# Patient Record
Sex: MALE | Race: WHITE | Employment: OTHER | URBAN - METROPOLITAN AREA
[De-identification: names, ages, dates, MRNs, and addresses within clinical notes are randomized per-mention and may not be internally consistent; named-entity substitution may affect disease eponyms.]

---

## 2017-09-06 ENCOUNTER — OFFICE VISIT (OUTPATIENT)
Dept: ORTHOPEDIC SURGERY | Age: 77
End: 2017-09-06

## 2017-09-06 VITALS — BODY MASS INDEX: 27.46 KG/M2 | HEIGHT: 74 IN | WEIGHT: 214 LBS

## 2017-09-06 DIAGNOSIS — M25.561 PAIN, JOINT, KNEE, RIGHT: Primary | ICD-10-CM

## 2017-09-06 DIAGNOSIS — Z96.651 H/O TOTAL KNEE REPLACEMENT, RIGHT: ICD-10-CM

## 2017-09-06 PROCEDURE — 3017F COLORECTAL CA SCREEN DOC REV: CPT | Performed by: ORTHOPAEDIC SURGERY

## 2017-09-06 PROCEDURE — 4040F PNEUMOC VAC/ADMIN/RCVD: CPT | Performed by: ORTHOPAEDIC SURGERY

## 2017-09-06 PROCEDURE — 1123F ACP DISCUSS/DSCN MKR DOCD: CPT | Performed by: ORTHOPAEDIC SURGERY

## 2017-09-06 PROCEDURE — 73560 X-RAY EXAM OF KNEE 1 OR 2: CPT | Performed by: ORTHOPAEDIC SURGERY

## 2017-09-06 PROCEDURE — 99213 OFFICE O/P EST LOW 20 MIN: CPT | Performed by: ORTHOPAEDIC SURGERY

## 2017-09-06 PROCEDURE — 1036F TOBACCO NON-USER: CPT | Performed by: ORTHOPAEDIC SURGERY

## 2017-09-06 PROCEDURE — G8427 DOCREV CUR MEDS BY ELIG CLIN: HCPCS | Performed by: ORTHOPAEDIC SURGERY

## 2017-09-06 PROCEDURE — G8419 CALC BMI OUT NRM PARAM NOF/U: HCPCS | Performed by: ORTHOPAEDIC SURGERY

## 2017-09-12 ENCOUNTER — HOSPITAL ENCOUNTER (OUTPATIENT)
Dept: PHYSICAL THERAPY | Age: 77
Discharge: HOME | End: 2017-09-30
Admitting: PHYSICAL MEDICINE & REHABILITATION

## 2017-09-12 NOTE — FLOWSHEET NOTE
[facility], [address] [place]  Phone: [phone]  Fax [phone]    Physical Therapy Daily Treatment Note  Date:  2017    Patient Name:  Willam Starks    :  1940  MRN: [id number]  Restrictions/Precautions:    Medical/Treatment Diagnosis Information:  · Diagnosis: Lumbar spondylosis M47.816, lumbar radiculopathy M54.16, low back pain M54.5  ·  treatment diagnosis: Low back pain J79.1  Insurance/Certification information:  PT Insurance Information: Medicare/ Medical Alpha.  Physician Information:  Referring Practitioner: Dr. Luis Keating of care signed (Y/N):     Date of Patient follow up with Physician: [facility] 17    G-Code (if applicable):      Date G-Code Applied:    PT G-Codes  Functional Assessment Tool Used: Oswestry  Score: 8%  Functional Limitation: Mobility: Walking and moving around  Mobility: Walking and Moving Around Current Status (): At least 1 percent but less than 20 percent impaired, limited or restricted  Mobility: Walking and Moving Around Goal Status (): At least 1 percent but less than 20 percent impaired, limited or restricted  Mobility: Walking and Moving Around Discharge Status (): At least 1 percent but less than 20 percent impaired, limited or restricted    Progress Note: [x]  Yes  []  No  Next due by: Visit #10      Latex Allergy:  [x]NO      []YES  Preferred Language for Healthcare:   [x]English       []other:    Visit # Insurance Allowable   1 Medicare/ Medical Alpha     Pain level:  -4/10     SUBJECTIVE:  See eval    OBJECTIVE: See eval  Observation:   Test measurements:      RESTRICTIONS/PRECAUTIONS:     Exercises/Interventions: HEP instruction.  See Media file    Therapeutic Ex sets/reps comments   DKTC stretches 5 HEP   HS stretches 5 HEP   LTR stretches 5 HEP   Piriformis stretches 5 HEP   Calf stretches 5 HEP        TrA H10 x 10 reps Manual Intervention                                              NMR re-education                                 Therapeutic Exercise and NMR EXR  [x] (90719) Provided verbal/tactile cueing for activities related to strengthening, flexibility, endurance, ROM  for improvements in proximal hip and core control with self care, mobility, lifting and ambulation.  [] (77505) Provided verbal/tactile cueing for activities related to improving balance, coordination, kinesthetic sense, posture, motor skill, proprioception  to assist with core control in self care, mobility, lifting, and ambulation.      Therapeutic Activities:    [x] (33131 or 28314) Provided verbal/tactile cueing for activities related to improving balance, coordination, kinesthetic sense, posture, motor skill, proprioception and motor activation to allow for proper function  with self care and ADLs  [] (40161) Provided training and instruction to the patient for proper core and proximal hip recruitment and positioning with ambulation re-education     Home Exercise Program:    [x] (78430) Reviewed/Progressed HEP activities related to strengthening, flexibility, endurance, ROM of core, proximal hip and LE for functional self-care, mobility, lifting and ambulation   [] (67711) Reviewed/Progressed HEP activities related to improving balance, coordination, kinesthetic sense, posture, motor skill, proprioception of core, proximal hip and LE for self care, mobility, lifting, and ambulation      Manual Treatments:  PROM / STM / Oscillations-Mobs:  G-I, II, III, IV (PA's, Inf., Post.)  [] (41875) Provided manual therapy to mobilize proximal hip and LS spine soft tissue/joints for the purpose of modulating pain, promoting relaxation,  increasing ROM, reducing/eliminating soft tissue swelling/inflammation/restriction, improving soft tissue extensibility and allowing for proper ROM for normal function with self care, mobility,

## 2017-09-12 NOTE — PLAN OF CARE
[facility], [address]  Phone: [phone]  Fax [phone]    Physical Therapy Certification    Dear Referring Practitioner: Dr. Nevin Yu,    We had the pleasure of evaluating the following patient for physical therapy services at [address] [place] [address]. A summary of our findings can be found in the initial assessment below. This includes our plan of care. If you have any questions or concerns regarding these findings, please do not hesitate to contact me at the office phone number checked above. Thank you for the referral.       Physician Signature:_______________________________Date:__________________  By signing above (or electronic signature), therapists plan is approved by physician    Patient: Bishop Alberto   : 1940   MRN: [id number]  Referring Physician: Referring Practitioner: Dr. Nevin Yu      Evaluation Date: 2017      Medical Diagnosis Information:  Diagnosis: Lumbar spondylosis M47.816, lumbar radiculopathy M54.16, low back pain M54.5          Treatment diagnosis: low back pain M54.5                                   Insurance information: PT Insurance Information: Medicare/ Medical [employer].     Precautions/ Contra-indications:   Latex Allergy:  [x]NO      []YES  Preferred Language for Healthcare:   [x]English       []other:    SUBJECTIVE: Patient stated complaint:Reports low back pain/buttock pain noted for last year. Has ANIBAL for lumbar spine scheduled for 17. Denies shooting pain down R LE but has sharp pain in ankle and knee on occasion. Describes feeling as more tightening versus pain. Main complaint is walking/stairs are affected.  R THR scheduled for next week by Dr. Prudencio Hutson History: History of R TKR , L hip resurfacing , Scheduled for R THR on 17  Functional Disability Index/G-Codes:  PT G-Codes  Functional assessed and Patient requires intervention due to being higher risk   TUG score (>12s at risk):     [] Falls education provided, including       G-Codes:  PT G-Codes  Functional Assessment Tool Used: Oswestry  Score: 8%  Functional Limitation: Mobility: Walking and moving around  Mobility: Walking and Moving Around Current Status (): At least 1 percent but less than 20 percent impaired, limited or restricted  Mobility: Walking and Moving Around Goal Status (): At least 1 percent but less than 20 percent impaired, limited or restricted  Mobility: Walking and Moving Around Discharge Status ():  At least 1 percent but less than 20 percent impaired, limited or restricted    ASSESSMENT:   Functional Impairments:     [x]Noted lumbar/proximal hip hypomobility   []Noted lumbosacral and/or generalized hypermobility   []Decreased Lumbosacral/hip/LE functional ROM   []Decreased core/proximal hip strength and neuromuscular control    [x]Decreased LE functional strength    []Abnormal reflexes/sensation/myotomal/dermatomal deficits  []Reduced balance/proprioceptive control    []other:      Functional Activity Limitations (from functional questionnaire and intake)   []Reduced ability to tolerate prolonged functional positions   []Reduced ability or difficulty with changes of positions or transfers between positions   []Reduced ability to maintain good posture and demonstrate good body mechanics with sitting, bending, and lifting   []Reduced ability to sleep   [] Reduced ability or tolerance with driving and/or computer work   []Reduced ability to perform lifting, reaching, carrying tasks   [x]Reduced ability to squat   []Reduced ability to forward bend   [x]Reduced ability to ambulate prolonged functional periods/distances/surfaces   [x]Reduced ability to ascend/descend stairs   []other:       Participation Restrictions   []Reduced participation in self care activities   []Reduced participation in home management activities   []Reduced participation in work activities   [x]Reduced participation in social activities. []Reduced participation in sport/recreation activities. Classification:   []Signs/symptoms consistent with Lumbar instability/stabilization subgroup. []Signs/symptoms consistent with Lumbar mobilization/manipulation subgroup, myotomes and dermatomes intact. Meets manipulation criteria. []Signs/symptoms consistent with Lumbar direction specific/centralization subgroup   []Signs/symptoms consistent with Lumbar traction subgroup     []Signs/symptoms consistent with lumbar facet dysfunction   [x]Signs/symptoms consistent with lumbar stenosis type dysfunction   []Signs/symptoms consistent with nerve root involvement including myotome & dermatome dysfunction   []Signs/symptoms consistent with post-surgical status including: decreased ROM, strength and function.    []signs/symptoms consistent with pathology which may benefit from Dry needling     []other:      Prognosis/Rehab Potential:      []Excellent   [x]Good    []Fair   []Poor    Tolerance of evaluation/treatment:    []Excellent   [x]Good    []Fair   []Poor  Physical Therapy Evaluation Complexity Justification  [x] A history of present problem with:  [] no personal factors and/or comorbidities that impact the plan of care;  [x]1-2 personal factors and/or comorbidities that impact the plan of care  []3 personal factors and/or comorbidities that impact the plan of care  [x] An examination of body systems using standardized tests and measures addressing any of the following: body structures and functions (impairments), activity limitations, and/or participation restrictions;:  [x] a total of 1-2 or more elements   [] a total of 3 or more elements   [] a total of 4 or more elements   [x] A clinical presentation with:  [x] stable and/or uncomplicated characteristics   [] evolving clinical presentation with changing characteristics  [] unstable and

## 2017-09-27 ENCOUNTER — HOSPITAL ENCOUNTER (OUTPATIENT)
Dept: PHYSICAL THERAPY | Age: 77
Discharge: HOME | End: 2017-09-30
Admitting: ORTHOPAEDIC SURGERY

## 2017-09-27 NOTE — FLOWSHEET NOTE
tissue extensibility and allowing for proper ROM for normal function with self care, mobility, lifting and ambulation. Modalities:  CP to R hip for 15 min     Charges:  Timed Code Treatment Minutes: 25   Total Treatment Minutes: 60     [x] EVAL (LOW) 70559 (typically 20 minutes face-to-face)  [] EVAL (MOD) 88696 (typically 30 minutes face-to-face)  [] EVAL (HIGH) 43912 (typically 45 minutes face-to-face)  [] RE-EVAL     [x] YN(01592) x  2   [] IONTO  [] NMR (11861) x      [] VASO  [] Manual (22703) x       [] Other:  [] TA x       [] Mech Traction (50126)  [] ES(attended) (78821)      [] ES (un) (39770):     GOALS:   Short Term Goals: To be achieved in: 2 weeks  1. Independent in HEP and progression per patient tolerance, in order to prevent re-injury. 2. Patient will have a decrease in pain to facilitate improvement in movement, function, and ADLs as indicated by Functional Deficits. Long Term Goals: To be achieved in: 8 weeks  1. Disability index score of 29% or less for the LEFS to assist with reaching prior level of function. 2. Patient will demonstrate increased AROM to R hip flex 100°, abd to 35° to allow for proper joint functioning as indicated by patients Functional Deficits. 3. Patient will demonstrate an increase in Strength to in LE to allow for proper functional mobility as indicated by patients Functional Deficits. 4. Patient will return to ambulating with proper gait without an AD, ascend and descend stairs with reciprocal without increased symptoms or restriction. Progression Towards Functional goals:  [] Patient is progressing as expected towards functional goals listed. [] Progression is slowed due to complexities listed. [] Progression has been slowed due to co-morbidities.   [x] Plan just implemented, too soon to assess goals progression  [] Other:     ASSESSMENT:  See eval    Treatment/Activity Tolerance:  [x] Patient tolerated treatment well [] Patient limited by

## 2017-10-05 ENCOUNTER — HOSPITAL ENCOUNTER (OUTPATIENT)
Dept: PHYSICAL THERAPY | Age: 77
Discharge: HOME | End: 2017-10-05
Admitting: ORTHOPAEDIC SURGERY

## 2017-10-05 NOTE — FLOWSHEET NOTE
stretch, gastroc stretch  12 min                                   NMR re-education                                              Therapeutic Exercise and NMR EXR  [x] (19582) Provided verbal/tactile cueing for activities related to strengthening, flexibility, endurance, ROM for improvements in LE, proximal hip, and core control with self care, mobility, lifting, ambulation. [x] (00143) Provided verbal/tactile cueing for activities related to improving balance, coordination, kinesthetic sense, posture, motor skill, proprioception  to assist with LE, proximal hip, and core control in self care, mobility, lifting, ambulation and eccentric single leg control.      NMR and Therapeutic Activities:    [] (00182 or 18971) Provided verbal/tactile cueing for activities related to improving balance, coordination, kinesthetic sense, posture, motor skill, proprioception and motor activation to allow for proper function of core, proximal hip and LE with self care and ADLs  [] (21599) Gait Re-education- Provided training and instruction to the patient for proper LE, core and proximal hip recruitment and positioning and eccentric body weight control with ambulation re-education including up and down stairs     Home Exercise Program:    [x] (66587) Reviewed/Progressed HEP activities related to strengthening, flexibility, endurance, ROM of core, proximal hip and LE for functional self-care, mobility, lifting and ambulation/stair navigation   [] (03764)Reviewed/Progressed HEP activities related to improving balance, coordination, kinesthetic sense, posture, motor skill, proprioception of core, proximal hip and LE for self care, mobility, lifting, and ambulation/stair navigation      Manual Treatments:  PROM / STM / Oscillations-Mobs:  G-I, II, III, IV (PA's, Inf., Post.)  [x] (25315) Provided manual therapy to mobilize LE, proximal hip and/or LS spine soft tissue/joints for the purpose of modulating pain, promoting relaxation, increasing ROM, reducing/eliminating soft tissue swelling/inflammation/restriction, improving soft tissue extensibility and allowing for proper ROM for normal function with self care, mobility, lifting and ambulation. Modalities:   pt declined will ice at home     Charges:  Timed Code Treatment Minutes: 40   Total Treatment Minutes: 40     [] EVAL (LOW) 51555 (typically 20 minutes face-to-face)  [] EVAL (MOD) 67869 (typically 30 minutes face-to-face)  [] EVAL (HIGH) 28850 (typically 45 minutes face-to-face)  [] RE-EVAL     [x] SP(73286) x  2   [] IONTO  [] NMR (33821) x      [] VASO  [x] Manual (45465) x  1    [] Other:  [] TA x       [] Mech Traction (40936)  [] ES(attended) (59762)      [] ES (un) (76905):     GOALS:   Short Term Goals: To be achieved in: 2 weeks  1. Independent in HEP and progression per patient tolerance, in order to prevent re-injury. 2. Patient will have a decrease in pain to facilitate improvement in movement, function, and ADLs as indicated by Functional Deficits. Long Term Goals: To be achieved in: 8 weeks  1. Disability index score of 29% or less for the LEFS to assist with reaching prior level of function. 2. Patient will demonstrate increased AROM to R hip flex 100°, abd to 35° to allow for proper joint functioning as indicated by patients Functional Deficits. 3. Patient will demonstrate an increase in Strength to in LE to allow for proper functional mobility as indicated by patients Functional Deficits. 4. Patient will return to ambulating with proper gait without an AD, ascend and descend stairs with reciprocal without increased symptoms or restriction. Progression Towards Functional goals:  [] Patient is progressing as expected towards functional goals listed. [] Progression is slowed due to complexities listed. [] Progression has been slowed due to co-morbidities.   [x] Plan just implemented, too soon to assess goals progression  [] Other:     ASSESSMENT:

## 2017-10-09 ENCOUNTER — HOSPITAL ENCOUNTER (OUTPATIENT)
Dept: PHYSICAL THERAPY | Age: 77
Discharge: HOME | End: 2017-10-09
Admitting: ORTHOPAEDIC SURGERY

## 2017-10-09 NOTE — FLOWSHEET NOTE
tissue/joints for the purpose of modulating pain, promoting relaxation,  increasing ROM, reducing/eliminating soft tissue swelling/inflammation/restriction, improving soft tissue extensibility and allowing for proper ROM for normal function with self care, mobility, lifting and ambulation. Modalities:   pt declined will ice at home     Charges:  Timed Code Treatment Minutes: 45   Total Treatment Minutes: 45     [] EVAL (LOW) 07395 (typically 20 minutes face-to-face)  [] EVAL (MOD) 24494 (typically 30 minutes face-to-face)  [] EVAL (HIGH) 41555 (typically 45 minutes face-to-face)  [] RE-EVAL     [x] TT(35380) x  2   [] IONTO  [] NMR (87591) x      [] VASO  [x] Manual (34012) x  1    [] Other:  [] TA x       [] Mech Traction (59013)  [] ES(attended) (21320)      [] ES (un) (95168):     GOALS:   Short Term Goals: To be achieved in: 2 weeks  1. Independent in HEP and progression per patient tolerance, in order to prevent re-injury. 2. Patient will have a decrease in pain to facilitate improvement in movement, function, and ADLs as indicated by Functional Deficits. Long Term Goals: To be achieved in: 8 weeks  1. Disability index score of 29% or less for the LEFS to assist with reaching prior level of function. 2. Patient will demonstrate increased AROM to R hip flex 100°, abd to 35° to allow for proper joint functioning as indicated by patients Functional Deficits. 3. Patient will demonstrate an increase in Strength to in LE to allow for proper functional mobility as indicated by patients Functional Deficits. 4. Patient will return to ambulating with proper gait without an AD, ascend and descend stairs with reciprocal without increased symptoms or restriction. Progression Towards Functional goals:  [] Patient is progressing as expected towards functional goals listed. [] Progression is slowed due to complexities listed. [] Progression has been slowed due to co-morbidities.   [x] Plan just

## 2017-10-11 ENCOUNTER — HOSPITAL ENCOUNTER (OUTPATIENT)
Dept: PHYSICAL THERAPY | Age: 77
Discharge: HOME | End: 2017-10-11
Admitting: ORTHOPAEDIC SURGERY

## 2017-10-11 NOTE — FLOWSHEET NOTE
[facility], [address] [place] [address] [place]  Phone: [phone]  Fax [phone]    Physical Therapy Daily Treatment Note  Date:  10/11/2017    Patient Name:  Lili Eisenberg    :  1940  MRN: [id number]  Restrictions/Precautions:    Medical/Treatment Diagnosis Information:  Diagnosis: R ant THR 17   Treatment Diagnosis: R hip pain R52.168  Insurance/Certification information:  PT Insurance Information: medicare   Physician Information:  Referring Practitioner: Dr. Astrid Aranda of care signed (Y/N): faxed; POC expires 17     Date of Patient follow up with Physician:      G-Code (if applicable):      Date G-Code Applied:  LEFS 51% 17        Progress Note: []  Yes  [x]  No  Next due by: Visit #10       Latex Allergy:  [x]NO      []YES  Preferred Language for Healthcare:   [x]English       []other:    Visit # Insurance Allowable   4 BMN      Pain level:  0/10 R hip     SUBJECTIVE: pt reports he feels like he has taken a step back interms of soreness to the front of his hip. He has started using his walking stick when walking outside the house because he does not feel like he can put as much weight through his R leg as he could before.   .     OBJECTIVE:   Observation:   Test measurements:      RESTRICTIONS/PRECAUTIONS: R TKR, hx of LBP,     Exercises/Interventions:     Therapeutic Ex Sets/sec Reps Notes   Supine TrA with hip abd with TB    Hep    Supine TrA with bridge with hip abd with VT  5\" 2x10   Hep    Supine SB knee flex   10\"x10   Hep    Sitting HS and gastroc stretch  3x30\" ea   Hep    Supine bridge with SB  5\" 2x10      Mini squat on airex  5\" 2x10      saq 4# 5\" 2x10      HR        Side stepping with TB      2 laps OTB       LAQ  5\" 2x10 3#      Bike  5 min warm up      Standing incline gastroc stretch  3x30\"      Manual Intervention      R hip PROM, HS stretch, gastroc stretch ; prone quad co-morbidities.   [] Plan just implemented, too soon to assess goals progression  [] Other:     ASSESSMENT:    Improved gait with walking stick, able to increase step length     Treatment/Activity Tolerance:  [x] Patient tolerated treatment well [] Patient limited by fatique  [] Patient limited by pain  [] Patient limited by other medical complications  [] Other:     Prognosis: [x] Good [] Fair  [] Poor    Patient Requires Follow-up: [x] Yes  [] No    PLAN:   [x] Continue per plan of care [] Alter current plan (see comments)  [] Plan of care initiated [] Hold pending MD visit [] Discharge    Electronically signed by: Griselda Quaker, [id number]

## 2017-10-16 ENCOUNTER — HOSPITAL ENCOUNTER (OUTPATIENT)
Dept: PHYSICAL THERAPY | Age: 77
Discharge: HOME | End: 2017-10-16
Admitting: ORTHOPAEDIC SURGERY

## 2017-10-16 NOTE — FLOWSHEET NOTE
Progression is slowed due to complexities listed. [] Progression has been slowed due to co-morbidities.   [] Plan just implemented, too soon to assess goals progression  [] Other:     ASSESSMENT:    Decreased balance with SLS on airex, requires UE asst     Treatment/Activity Tolerance:  [x] Patient tolerated treatment well [] Patient limited by fatique  [] Patient limited by pain  [] Patient limited by other medical complications  [] Other:     Prognosis: [x] Good [] Fair  [] Poor    Patient Requires Follow-up: [x] Yes  [] No    PLAN:   [x] Continue per plan of care [] Alter current plan (see comments)  [] Plan of care initiated [] Hold pending MD visit [] Discharge    Electronically signed by: Deacon Portillo, [id number]

## 2017-10-18 ENCOUNTER — HOSPITAL ENCOUNTER (OUTPATIENT)
Dept: PHYSICAL THERAPY | Age: 77
Discharge: HOME | End: 2017-10-18
Admitting: ORTHOPAEDIC SURGERY

## 2017-10-18 NOTE — FLOWSHEET NOTE
quad stretch, supine psoas stretch with thigh on mat  15 min                                   NMR re-education      Standing weight shifts on airex       Gait training without an AD        SLS  airex  5\" 2x10      FSU and LSU  2x10 ea 4\"      Standing TKE with TB  BTB 5\" 2x10                Therapeutic Exercise and NMR EXR  [x] (33728) Provided verbal/tactile cueing for activities related to strengthening, flexibility, endurance, ROM for improvements in LE, proximal hip, and core control with self care, mobility, lifting, ambulation. [x] (77080) Provided verbal/tactile cueing for activities related to improving balance, coordination, kinesthetic sense, posture, motor skill, proprioception  to assist with LE, proximal hip, and core control in self care, mobility, lifting, ambulation and eccentric single leg control.      NMR and Therapeutic Activities:    [] (33274 or 34368) Provided verbal/tactile cueing for activities related to improving balance, coordination, kinesthetic sense, posture, motor skill, proprioception and motor activation to allow for proper function of core, proximal hip and LE with self care and ADLs  [x] (12795) Gait Re-education- Provided training and instruction to the patient for proper LE, core and proximal hip recruitment and positioning and eccentric body weight control with ambulation re-education including up and down stairs     Home Exercise Program:    [x] (93706) Reviewed/Progressed HEP activities related to strengthening, flexibility, endurance, ROM of core, proximal hip and LE for functional self-care, mobility, lifting and ambulation/stair navigation   [] (53698)Reviewed/Progressed HEP activities related to improving balance, coordination, kinesthetic sense, posture, motor skill, proprioception of core, proximal hip and LE for self care, mobility, lifting, and ambulation/stair navigation      Manual Treatments:  PROM / STM / Oscillations-Mobs:  G-I, II, III, IV (PA's, Inf., complexities listed. [] Progression has been slowed due to co-morbidities.   [] Plan just implemented, too soon to assess goals progression  [] Other:     ASSESSMENT:     Pt has increasing R hip AROM     Treatment/Activity Tolerance:  [x] Patient tolerated treatment well [] Patient limited by fatique  [] Patient limited by pain  [] Patient limited by other medical complications  [] Other:     Prognosis: [x] Good [] Fair  [] Poor    Patient Requires Follow-up: [x] Yes  [] No    PLAN:  start ATC NV   [x] Continue per plan of care [] Alter current plan (see comments)  [] Plan of care initiated [] Hold pending MD visit [] Discharge    Electronically signed by: Griselda Quaker, [id number]

## 2017-10-23 ENCOUNTER — HOSPITAL ENCOUNTER (OUTPATIENT)
Dept: PHYSICAL THERAPY | Age: 77
Discharge: HOME | End: 2017-10-23
Admitting: ORTHOPAEDIC SURGERY

## 2017-10-23 NOTE — FLOWSHEET NOTE
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 Joy Doherty Post-op  8 wks  12 wks 16 wks 20 wks   24 wks                            Activity Log                                                  DOS/DOI:                                                    Date: 10/23/17    ATC communication    Bike    Elliptical    Treadmill    Airdyne        Gastroc stretch    Soleus stretch    Hamstring stretch    ITB stretch    Hip Flexor stretch    Quad stretch    Adductor stretch        Weight Shifting sp                              fp                              tp    Lateral walking (with/w/o TB)        Balance: PEP/Deysi board                   SLS Tandem R/L 5x10\" ea   SLS 5x10\"          Star excursion load/explode          Extremity reach UE/LE        Leg Press Gm. 80# 2x10                      Ecc.                      Inv. Calf Press Gm. Ecc.                        Inv.        PHILIP   Flex               ABd               ADd              TKE 45# 20x5\"              Ext        Steps Up 6\" FSU15x              Up and Over               Down               Lateral 6\" LSU 15x              Rotation        Squats  mini                  wall                 BOSU         Lunges:  Lunge to Balance                   Balance to Lunge                   Walking        Knee Extension Bilat. Ecc.                               Inv. Hamstring Curls Bilat.                                Ecc. Inv.        Soleus Press Bilat. Ecc.                           Inv.                             Ladders                Square               Jump/Hop  Low                      Med.                      High                                                            Modality Declined   Initials                             BMG

## 2017-10-25 ENCOUNTER — HOSPITAL ENCOUNTER (OUTPATIENT)
Dept: PHYSICAL THERAPY | Age: 77
Discharge: HOME | End: 2017-10-25
Admitting: ORTHOPAEDIC SURGERY

## 2017-10-25 NOTE — FLOWSHEET NOTE
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 Renard Leyva Post-op  8 wks  12 wks 16 wks 20 wks   24 wks                            Activity Log                                                  DOS/DOI:                                                    Date: 10/23/17     ATC communication     Bike     Elliptical     Treadmill     Airdyne          Gastroc stretch     Soleus stretch     Hamstring stretch     ITB stretch     Hip Flexor stretch     Quad stretch     Adductor stretch          Weight Shifting sp                               fp                               tp     Lateral walking (with/w/o TB)          Balance: PEP/Deysi board                    SLS Tandem R/L 5x10\" ea   SLS 5x10\"  w/PT         Star excursion load/explode           Extremity reach UE/LE          Leg Press Gm. 80# 2x10  80# 2x10                     Ecc.                       Inv. Calf Press Gm. Ecc.                         Inv.          PHILIP   Flex                ABd  30# R/L 2x10              ADd               TKE 45# 20x5\" 45# 20x5\"              Ext          Steps Up 6\" FSU15x steps w/PT              Up and Over                Down                Lateral 6\" LSU 15x               Rotation          Squats  mini  w/PT                 wall                  BOSU           Lunges:  Lunge to Balance                    Balance to Lunge                    Walking          Knee Extension Bilat.                                                 Ecc.

## 2017-10-30 ENCOUNTER — HOSPITAL ENCOUNTER (OUTPATIENT)
Dept: PHYSICAL THERAPY | Age: 77
Discharge: HOME | End: 2017-10-30
Admitting: ORTHOPAEDIC SURGERY

## 2017-10-30 NOTE — PLAN OF CARE
goals:  Summary/Patient's response to treatment/Additional assessment:    New or Updated Goals (if applicable):  [x] No change to goals established upon initial eval/last progress note:  New Goals:    Electronically signed by:  Gianna Del Angel PT

## 2017-10-30 NOTE — FLOWSHEET NOTE
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 Essie Taveras of care signed (Y/N): faxed; POC expires 17     Date of Patient follow up with Physician:      G-Code (if applicable):      Date G-Code Applied:  LEFS 48% 10/30/17   PT G-Codes  Functional Assessment Tool Used: LEFS   Score: 48%  Functional Limitation: Mobility: Walking and moving around  Mobility: Walking and Moving Around Current Status (): At least 40 percent but less than 60 percent impaired, limited or restricted  Mobility: Walking and Moving Around Goal Status ():  At least 20 percent but less than 40 percent impaired, limited or restricted    Progress Note: [x]  Yes  []  No  Next due by: Visit #10       Latex Allergy:  [x]NO      []YES  Preferred Language for Healthcare:   [x]English       []other:    Visit # Insurance Allowable   9 BMN      Pain level:  0/10 R hip     SUBJECTIVE:   See [facility] 10th visit note      OBJECTIVE: 5.5 weeks post op  Observation:   Test measurements:   Cautioned patient against performing twisting motions at the hip     RESTRICTIONS/PRECAUTIONS: R TKR, hx of LBP,     Exercises/Interventions:     Therapeutic Ex Sets/sec Reps Notes   Supine knee flex with belt, with lower leg off table  10\"x10   Hep    Supine TrA with bridge with hip abd with IN  5\" 2x10   Hep    Supine SB knee flex   10\"x10   Hep    Sitting HS and gastroc stretch     Hep    Supine bridge with SB        Mini squat on dyno disc  5\" 2x10      Modified plank with knees  1   Hep    Stool walking for HS 2 laps      Side stepping with TB      2 laps OTB  At ankles      LAQ  5\" 2x10 4#      Prone quad stretch with belt  10\"x10      SL hip abd     Hep 10/16/17    Bike  5 min warm up      Standing incline gastroc stretch  3x30\"      Manual Intervention      R hip PROM, HS stretch,  stretch ; prone quad stretch, supine psoas stretch with thigh on mat ; stick massage to R  15 min                                   NMR re-education      Standing hip abd glides  2x10 B      tandem on dyno disc   5\" x10       SLS  airex  5\" 2x10      FSU and over  2x10  6\"      Standing TKE with TB  BTB 5\" 2x10      Mini squat on dyno disc  5\" 2x10          Therapeutic Exercise and NMR EXR  [x] (16275) Provided verbal/tactile cueing for activities related to strengthening, flexibility, endurance, ROM for improvements in LE, proximal hip, and core control with self care, mobility, lifting, ambulation. [x] (95039) Provided verbal/tactile cueing for activities related to improving balance, coordination, kinesthetic sense, posture, motor skill, proprioception  to assist with LE, proximal hip, and core control in self care, mobility, lifting, ambulation and eccentric single leg control.      NMR and Therapeutic Activities:    [] (92706 or 23783) Provided verbal/tactile cueing for activities related to improving balance, coordination, kinesthetic sense, posture, motor skill, proprioception and motor activation to allow for proper function of core, proximal hip and LE with self care and ADLs  [x] (27615) Gait Re-education- Provided training and instruction to the patient for proper LE, core and proximal hip recruitment and positioning and eccentric body weight control with ambulation re-education including up and down stairs     Home Exercise Program:    [x] (07813) Reviewed/Progressed HEP activities related to strengthening, flexibility, endurance, ROM of core, proximal hip and LE for functional self-care, mobility, lifting and ambulation/stair navigation   [] (79499)Reviewed/Progressed HEP activities related to improving balance, coordination, kinesthetic sense, posture, motor skill, proprioception of core, proximal hip and LE for self care, mobility, lifting, and ambulation/stair navigation      Manual Treatments:  PROM / STM / Oscillations-Mobs:  G-I, II, III, IV (PA's, Inf., Post.)  [x] (46188) Provided manual therapy to mobilize LE, proximal hip and/or LS spine soft tissue/joints for the purpose of modulating pain, promoting relaxation,  increasing ROM, reducing/eliminating soft tissue swelling/inflammation/restriction, improving soft tissue extensibility and allowing for proper ROM for normal function with self care, mobility, lifting and ambulation. Modalities:   pt declined will ice at home     Charges:  Timed Code Treatment Minutes: 45   Total Treatment Minutes: 45     [] EVAL (LOW) 25802 (typically 20 minutes face-to-face)  [] EVAL (MOD) 89006 (typically 30 minutes face-to-face)  [] EVAL (HIGH) 29094 (typically 45 minutes face-to-face)  [] RE-EVAL     [x] FR(46559) x  1   [] IONTO  [x] NMR (51163) x  1   [] VASO  [x] Manual (71657) x  1    [] Other:  [] TA x       [] Mech Traction (20411)  [] ES(attended) (57668)      [] ES (un) (70374):     GOALS:   Short Term Goals: To be achieved in: 2 weeks  1. Independent in HEP and progression per patient tolerance, in order to prevent re-injury. Met   2. Patient will have a decrease in pain to facilitate improvement in movement, function, and ADLs as indicated by Functional Deficits. met     Long Term Goals: To be achieved in: 8 weeks  1. Disability index score of 29% or less for the LEFS to assist with reaching prior level of function. Improving   2. Patient will demonstrate increased AROM to R hip flex 100°, abd to 35° to allow for proper joint functioning as indicated by patients Functional Deficits. improving   3.  Patient will demonstrate an increase in Strength to in LE to allow for proper functional mobility as indicated by patients Functional Deficits. 4. Patient will return to ambulating with proper gait without an AD, ascend and descend stairs with reciprocal without increased symptoms or restriction. improving     Progression Towards Functional goals:  [x] Patient is progressing as expected towards functional goals listed. [] Progression is slowed due to complexities listed. [] Progression has been slowed due to co-morbidities.   [] Plan just implemented, too soon to assess goals progression  [] Other:     ASSESSMENT:    See 10th visit note     Treatment/Activity Tolerance:  [x] Patient tolerated treatment well [] Patient limited by fatique  [] Patient limited by pain  [] Patient limited by other medical complications  [] Other:     Prognosis: [x] Good [] Fair  [] Poor    Patient Requires Follow-up: [x] Yes  [] No    PLAN:   [x] Continue per plan of care [] Alter current plan (see comments)  [] Plan of care initiated [] Hold pending MD visit [] Discharge    Electronically signed by: Nghia Castanon, [id number]

## 2017-11-01 ENCOUNTER — HOSPITAL ENCOUNTER (OUTPATIENT)
Dept: PHYSICAL THERAPY | Age: 77
Discharge: HOME | End: 2017-11-30
Attending: ORTHOPAEDIC SURGERY | Admitting: ORTHOPAEDIC SURGERY

## 2017-11-01 ENCOUNTER — HOSPITAL ENCOUNTER (OUTPATIENT)
Dept: PHYSICAL THERAPY | Age: 77
Discharge: HOME | End: 2017-11-01
Admitting: ORTHOPAEDIC SURGERY

## 2017-11-01 NOTE — FLOWSHEET NOTE
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 Chelsea Avalos Post-op  8 wks  12 wks 16 wks 20 wks   24 wks                            Activity Log                                                  DOS/DOI:                                                    Date: 10/23/17  10/25/17 11/1/17   ATC communication      Bike      Elliptical      Treadmill      Airdyne            Gastroc stretch      Soleus stretch      Hamstring stretch      ITB stretch      Hip Flexor stretch      Quad stretch      Adductor stretch            Weight Shifting sp                                fp                                tp      Lateral walking (with/w/o TB)            Balance: PEP/Deysi board                     SLS Tandem R/L 5x10\" ea   SLS 5x10\"  w/PT          Star excursion load/explode            Extremity reach UE/LE            Leg Press Gm. 80# 2x10  80# 2x10 80# 3x10                     Ecc.                        Inv.   60# 2x10         Calf Press Gm.                          Ecc.                          Inv.            PHILIP   Flex                 ABd  30# R/L 2x10 30# R/L 2x10              ADd                TKE 45# 20x5\" 45# 20x5\" 45# 20x5\"              Ext            Steps Up 6\" FSU15x steps w/PT               Up and Over                 Down                 Lateral 6\" LSU 15x                Rotation            Squats  mini  w/PT                  wall                   BOSU             Lunges:  Lunge to Balance                     Balance to [facility] Knee Extension Bilat. Ecc.                                 Inv. Hamstring Curls Bilat. 45# 2x10 45# 2x10                              Ecc.                                 Inv.            Soleus Press Bilat. Ecc.                             Inv.                                   Ladders                  Square                 Jump/Hop  Low                        Med.                        High                                                                  Modality Declined declined declined   Initials                             RODRIGUEZ AGUILARW

## 2017-11-01 NOTE — FLOWSHEET NOTE
[facility], [address] [place] [address] [place]  Phone: [phone]  Fax [phone]    Physical Therapy Daily Treatment Note  Date:  2017    Patient Name:  Randy Sanford    :  1940  MRN: [id number]  Restrictions/Precautions:    Medical/Treatment Diagnosis Information:  Diagnosis: R ant THR 17   Treatment Diagnosis: R hip pain A46.700  Insurance/Certification information:  PT Insurance Information: medicare   Physician Information:  Referring Practitioner: Dr. Fountain [facility] of care signed (Y/N): faxed; POC expires 17     Date of Patient follow up with Physician:      G-Code (if applicable):      Date G-Code Applied:  LEFS 48% 10/30/17        Progress Note: []  Yes  [x]  No  Next due by: Visit #10       Latex Allergy:  [x]NO      []YES  Preferred Language for Healthcare:   [x]English       []other:    Visit # Insurance Allowable   10 BMN      Pain level:  0/10 R hip     SUBJECTIVE:  Pt reports [place] [facility] [place].   Pt reports         OBJECTIVE: 6 weeks post op  Observation:   Test measurements:   Cautioned patient against performing twisting motions at the hip     RESTRICTIONS/PRECAUTIONS: R TKR, hx of LBP,     Exercises/Interventions:     Therapeutic Ex Sets/sec Reps Notes   Supine knee flex with belt, with lower leg off table  10\"x10   Hep    Supine TrA with bridge with hip abd with DC  5\" 2x10   Hep    Supine SB knee flex   10\"x10   Hep    Sitting HS and gastroc stretch     Hep    Supine bridge with SB with HS curl   2x10      Mini squat on dyno disc  5\" 2x10      Modified plank with knees  1   Hep    Stool walking for HS 2 laps      Side stepping with TB      2 laps OTB  At ankles      LAQ  5\" 2x10 4#      Prone quad stretch with belt  10\"x10      SL hip abd with DC 3\" 2x10   Hep 10/16/17    Bike  5 min warm up      Standing incline gastroc stretch  3x30\" Manual Intervention      R hip PROM, HS stretch,  stretch ; prone quad stretch, supine psoas stretch with thigh on mat ; stick massage to R ITB and quad  15 min                                   NMR re-education      Standing hip abd and ext  glides  2x10 B ea      Post step backs  6\" 2x10       SLS  airex  10\"x10       FSU and over  2x10  6\"      Standing TKE with TB        Mini squat on dyno disc  5\" 2x10          Therapeutic Exercise and NMR EXR  [x] (25176) Provided verbal/tactile cueing for activities related to strengthening, flexibility, endurance, ROM for improvements in LE, proximal hip, and core control with self care, mobility, lifting, ambulation. [x] (32306) Provided verbal/tactile cueing for activities related to improving balance, coordination, kinesthetic sense, posture, motor skill, proprioception  to assist with LE, proximal hip, and core control in self care, mobility, lifting, ambulation and eccentric single leg control.      NMR and Therapeutic Activities:    [] (91205 or 87587) Provided verbal/tactile cueing for activities related to improving balance, coordination, kinesthetic sense, posture, motor skill, proprioception and motor activation to allow for proper function of core, proximal hip and LE with self care and ADLs  [x] (11474) Gait Re-education- Provided training and instruction to the patient for proper LE, core and proximal hip recruitment and positioning and eccentric body weight control with ambulation re-education including up and down stairs     Home Exercise Program:    [x] (46930) Reviewed/Progressed HEP activities related to strengthening, flexibility, endurance, ROM of core, proximal hip and LE for functional self-care, mobility, lifting and ambulation/stair navigation   [] (19505)Reviewed/Progressed HEP activities related to improving balance, coordination, kinesthetic sense, posture, motor skill, proprioception of core, proximal hip and LE for self care, mobility, lifting,

## 2017-11-06 ENCOUNTER — HOSPITAL ENCOUNTER (OUTPATIENT)
Dept: PHYSICAL THERAPY | Age: 77
Discharge: HOME | End: 2017-11-06
Admitting: ORTHOPAEDIC SURGERY

## 2017-11-06 NOTE — DISCHARGE SUMMARY
[facility],  [address]  Phone: [phone]  Fax [phone]       Physical Therapy Discharge  Date: 2017        Patient Name:  Kimmy Avalos    :  1940  MRN: [id number]  Referring Physician: Dr. Mo Brown   Diagnosis: R ant THR                        ICD Code: M25.551  [x] Surgical [] Conservative  Therapy Diagnosis/Practice Pattern: 4H      Number of Comorbidities:  []0     [x]1-2    []3+  Total number of visits: 11  Reporting Period:   Beginning Date:17   End Date:17    OBJECTIVE  Test used Initial score Discharge Score   Pain Summary  1-2/10 pain  0/10 pain    Functional questionnaire LEFS  51% 28%   Functional Testing            ROM Hip flex  85° 95°    Knee flex  110° 120°   Strength Hip flex  4 4+    Knee ext   Knee flex   Hip abd  4  4+  NT 4+  5  4/4+        Functional Limitation G-Code (if applicable):         PT G-Codes  Functional Assessment Tool Used: LEFS   Score: 28%  Functional Limitation: Mobility: Walking and moving around  Mobility: Walking and Moving Around Current Status (): At least 20 percent but less than 40 percent impaired, limited or restricted  Mobility: Walking and Moving Around Goal Status (): At least 20 percent but less than 40 percent impaired, limited or restricted  Mobility: Walking and Moving Around Discharge Status ():  At least 20 percent but less than 40 percent impaired, limited or restricted   Test/tests used to determine % limitation:  Actual Score used to drive % limitation:    Treatment to date:  [x] Therapeutic Exercise    [x] Modalities:  [] Therapeutic Activity             []Ultrasound            []Electrical Stimulation  [x] Gait Training     []Cervical Traction    [] Lumbar Traction  [x] Neuromuscular Re-education [x] Cold/hotpack         []Iontophoresis  [x] Instruction in HEP      Other:  [x] Manual Therapy                   []

## 2017-11-06 NOTE — FLOWSHEET NOTE
[facility] [address] [place]  Phone: [phone]  Fax [phone]      ATHLETIC TRAINING [address]   Date:  2017    Patient Name:  Kimmy Avalos    :  1940  MRN: [id number]  Restrictions/Precautions:    Medical/Treatment Diagnosis Information:  ·   Diagnosis: R ant THR 17   · Treatment Diagnosis: R hip pain M25.551  Physician Information:    Referring Practitioner: Dr. Diogo Jones Post-op  8 wks  12 wks 16 wks 20 wks   24 wks                            Activity Log                                                  DOS/DOI:                                                    Date: 10/25/17 11/1/17 11/6/17   ATC communication   Today is pt's last day   Bike      Elliptical      Treadmill      Airdyne            Gastroc stretch      Soleus stretch      Hamstring stretch      ITB stretch      Hip Flexor stretch      Quad stretch      Adductor stretch            Weight Shifting sp                                fp                                tp      Lateral walking (with/w/o TB)            Balance: PEP/Deysi board                     SLS w/PT           Star excursion load/explode            Extremity reach UE/LE            Leg Press Gm. 80# 2x10 80# 3x10 80# 3x10                      Ecc.   60# 2x10                      Inv.  60# 2x10          Calf Press Gm.                          Ecc.                          Inv.            PHILIP   Flex                 ABd 30# R/L 2x10 30# R/L 2x10 45# R/L 2x10               ADd                TKE 45# 20x5\" 45# 20x5\"               Ext   45# R/L 2x10          Steps Up steps w/PT  Steps w/ PT              Up and Over                 Down                 Lateral                 Rotation            Squats  mini w/PT                   wall                   BOSU            Lungjarred:  Lunge to Balance                     Balance to [facility] Knee Extension Bilat. 25# 2x10                               Ecc.                                 Inv. Hamstring Curls Bilat. 45# 2x10 45# 2x10 45# 2x10                               Ecc.                                 Inv.            Soleus Press Bilat. Ecc.                             Inv.                                   Ladders                  Square                 Jump/Hop  Low                        Med.                        High                                                                  Modality declined declined Declined    Initials                             JLW JEFFW BMG

## 2017-11-06 NOTE — FLOWSHEET NOTE
[facility], [address] [place] [address] [place]  Phone: [phone]  Fax [phone]    Physical Therapy Daily Treatment Note  Date:  2017    Patient Name:  Ruben Carson    :  1940  MRN: [id number]  Restrictions/Precautions:    Medical/Treatment Diagnosis Information:  Diagnosis: R ant THR 17   Treatment Diagnosis: R hip pain O26.493  Insurance/Certification information:  PT Insurance Information: medicare   Physician Information:  Referring Practitioner: Dr. Mariel Russ of [facility] signed (Y/N): faxed; POC expires 17     Date of Patient follow up with Physician:      G-Code (if applicable):      Date G-Code Applied:  LEFS 48% 10/30/17        Progress Note: [x]  Yes  []  No  Next due by: Visit #10       Latex Allergy:  [x]NO      []YES  Preferred Language for Healthcare:   [x]English       []other:    Visit # Insurance Allowable   11 BMN      Pain level:  0/10 R hip     SUBJECTIVE:  See d/c note; MD said to d/c PT     OBJECTIVE: 6.5 weeks post op; see d/c note   Observation:   Test measurements:       RESTRICTIONS/PRECAUTIONS: R TKR, hx of LBP,     Exercises/Interventions:     Therapeutic Ex Sets/sec Reps Notes   Supine knee flex with belt, with lower leg off table  10\"x10   Hep    Supine TrA with bridge with hip abd with CO  5\" 2x10   Hep    Supine SB knee flex   10\"x10   Hep    Sitting HS and gastroc stretch     Hep    Supine bridge with SB with HS curl   2x10      Mini squat on inverted BOSU   5\" 2x10      Modified plank with knees  1   Hep    Stool walking for HS 2 laps      Side stepping with TB      2 laps light GTB   At ankles      LAQ  5\" 2x10 4#      Prone quad stretch with belt  10\"x10      SL hip abd with CO 3\" 2x10   Hep 10/16/17    Bike  5 min warm up      Standing incline gastroc stretch  3x30\"      Manual Intervention      R hip PROM, HS stretch,  stretch ; prone quad stretch, supine psoas Oscillations-Mobs:  G-I, II, III, IV (PA's, Inf., Post.)  [x] (70718) Provided manual therapy to mobilize LE, proximal hip and/or LS spine soft tissue/joints for the purpose of modulating pain, promoting relaxation,  increasing ROM, reducing/eliminating soft tissue swelling/inflammation/restriction, improving soft tissue extensibility and allowing for proper ROM for normal function with self care, mobility, lifting and ambulation. Modalities:   pt declined will ice at home     Charges:  Timed Code Treatment Minutes: 45   Total Treatment Minutes: 45     [] EVAL (LOW) 79516 (typically 20 minutes face-to-face)  [] EVAL (MOD) 30108 (typically 30 minutes face-to-face)  [] EVAL (HIGH) 66602 (typically 45 minutes face-to-face)  [] RE-EVAL     [x] YX(22906) x  1   [] IONTO  [x] NMR (82216) x  1   [] VASO  [x] Manual (02735) x  1    [] Other:  [] TA x       [] Mech Traction (27806)  [] ES(attended) (96797)      [] ES (un) (47416):     GOALS:   Short Term Goals: To be achieved in: 2 weeks  1. Independent in HEP and progression per patient tolerance, in order to prevent re-injury. Met   2. Patient will have a decrease in pain to facilitate improvement in movement, function, and ADLs as indicated by Functional Deficits. met     Long Term Goals: To be achieved in: 8 weeks  1. Disability index score of 29% or less for the LEFS to assist with reaching prior level of function. Improving   2. Patient will demonstrate increased AROM to R hip flex 100°, abd to 35° to allow for proper joint functioning as indicated by patients Functional Deficits. improving   3. Patient will demonstrate an increase in Strength to in LE to allow for proper functional mobility as indicated by patients Functional Deficits. 4. Patient will return to ambulating with proper gait without an AD, ascend and descend stairs with reciprocal without increased symptoms or restriction. improving     Progression Towards Functional goals:  [x] Patient is progressing as expected towards functional goals listed. [] Progression is slowed due to complexities listed. [] Progression has been slowed due to co-morbidities.   [] Plan just implemented, too soon to assess goals progression  [] Other:     ASSESSMENT:   See d/c note     Treatment/Activity Tolerance:  [x] Patient tolerated treatment well [] Patient limited by fatique  [] Patient limited by pain  [] Patient limited by other medical complications  [] Other:     Prognosis: [x] Good [] Fair  [] Poor    Patient Requires Follow-up: [] Yes  [x] No    PLAN:   [] Continue per plan of care [] Alter current plan (see comments)  [] Plan of care initiated [] Hold pending MD visit [x] Discharge    Electronically signed by: Edna Courtney, [id number]

## 2017-12-01 ENCOUNTER — HOSPITAL ENCOUNTER (OUTPATIENT)
Dept: PHYSICAL THERAPY | Age: 77
Discharge: HOME | End: 2017-12-31
Attending: ORTHOPAEDIC SURGERY | Admitting: ORTHOPAEDIC SURGERY

## 2020-01-09 RX ORDER — MULTIVIT-MIN/IRON/FOLIC ACID/K 18-600-40
CAPSULE ORAL
COMMUNITY

## 2020-01-09 RX ORDER — ATORVASTATIN CALCIUM 10 MG/1
10 TABLET, FILM COATED ORAL DAILY
COMMUNITY

## 2020-01-09 RX ORDER — DIMENHYDRINATE 50 MG
TABLET ORAL
COMMUNITY

## 2020-01-09 RX ORDER — SILDENAFIL 25 MG/1
25 TABLET, FILM COATED ORAL PRN
COMMUNITY

## 2020-01-13 ENCOUNTER — ANESTHESIA EVENT (OUTPATIENT)
Dept: OPERATING ROOM | Age: 80
End: 2020-01-13
Payer: MEDICARE

## 2020-01-15 ENCOUNTER — ANESTHESIA (OUTPATIENT)
Dept: OPERATING ROOM | Age: 80
End: 2020-01-15
Payer: MEDICARE

## 2020-01-15 ENCOUNTER — HOSPITAL ENCOUNTER (OUTPATIENT)
Age: 80
Setting detail: OUTPATIENT SURGERY
Discharge: HOME | End: 2020-01-15
Attending: OPHTHALMOLOGY | Admitting: OPHTHALMOLOGY
Payer: MEDICARE

## 2020-01-15 VITALS
SYSTOLIC BLOOD PRESSURE: 121 MMHG | RESPIRATION RATE: 16 BRPM | HEIGHT: 74 IN | HEART RATE: 64 BPM | TEMPERATURE: 97.5 F | BODY MASS INDEX: 25.67 KG/M2 | WEIGHT: 200 LBS | OXYGEN SATURATION: 99 % | DIASTOLIC BLOOD PRESSURE: 65 MMHG

## 2020-01-15 VITALS — OXYGEN SATURATION: 100 % | SYSTOLIC BLOOD PRESSURE: 128 MMHG | DIASTOLIC BLOOD PRESSURE: 75 MMHG

## 2020-01-15 PROCEDURE — 2500000003 HC RX 250 WO HCPCS: Performed by: OPHTHALMOLOGY

## 2020-01-15 PROCEDURE — V2632 POST CHMBR INTRAOCULAR LENS: HCPCS | Performed by: OPHTHALMOLOGY

## 2020-01-15 PROCEDURE — 2580000003 HC RX 258: Performed by: OPHTHALMOLOGY

## 2020-01-15 PROCEDURE — 6370000000 HC RX 637 (ALT 250 FOR IP): Performed by: OPHTHALMOLOGY

## 2020-01-15 PROCEDURE — 3700000000 HC ANESTHESIA ATTENDED CARE: Performed by: OPHTHALMOLOGY

## 2020-01-15 PROCEDURE — 3700000001 HC ADD 15 MINUTES (ANESTHESIA): Performed by: OPHTHALMOLOGY

## 2020-01-15 PROCEDURE — 3600000013 HC SURGERY LEVEL 3 ADDTL 15MIN: Performed by: OPHTHALMOLOGY

## 2020-01-15 PROCEDURE — 2580000003 HC RX 258: Performed by: ANESTHESIOLOGY

## 2020-01-15 PROCEDURE — 7100000010 HC PHASE II RECOVERY - FIRST 15 MIN: Performed by: OPHTHALMOLOGY

## 2020-01-15 PROCEDURE — 6360000002 HC RX W HCPCS: Performed by: NURSE ANESTHETIST, CERTIFIED REGISTERED

## 2020-01-15 PROCEDURE — 7100000011 HC PHASE II RECOVERY - ADDTL 15 MIN: Performed by: OPHTHALMOLOGY

## 2020-01-15 PROCEDURE — 2709999900 HC NON-CHARGEABLE SUPPLY: Performed by: OPHTHALMOLOGY

## 2020-01-15 PROCEDURE — 6360000002 HC RX W HCPCS: Performed by: OPHTHALMOLOGY

## 2020-01-15 PROCEDURE — 3600000003 HC SURGERY LEVEL 3 BASE: Performed by: OPHTHALMOLOGY

## 2020-01-15 PROCEDURE — 2500000003 HC RX 250 WO HCPCS: Performed by: ANESTHESIOLOGY

## 2020-01-15 DEVICE — ACRYSOF(R) IQ ASPHERIC IOL SP ACRYLIC FOLDABLELENS WULTRASERT(TM) DELIVERY SYSTEM UV WBLUE LIGHT FILTER. 13.0MM LENGTH 6.0MM ANTERIORASYMMETRIC BICONVEX OPTIC PLANAR HAPTICS.
Type: IMPLANTABLE DEVICE | Status: FUNCTIONAL
Brand: ACRYSOF ULTRASERT

## 2020-01-15 RX ORDER — MOXIFLOXACIN 5 MG/ML
SOLUTION/ DROPS OPHTHALMIC PRN
Status: DISCONTINUED | OUTPATIENT
Start: 2020-01-15 | End: 2020-01-15 | Stop reason: ALTCHOICE

## 2020-01-15 RX ORDER — SODIUM CHLORIDE 0.9 % (FLUSH) 0.9 %
10 SYRINGE (ML) INJECTION PRN
Status: DISCONTINUED | OUTPATIENT
Start: 2020-01-15 | End: 2020-01-15 | Stop reason: HOSPADM

## 2020-01-15 RX ORDER — PROPOFOL 10 MG/ML
INJECTION, EMULSION INTRAVENOUS PRN
Status: DISCONTINUED | OUTPATIENT
Start: 2020-01-15 | End: 2020-01-15 | Stop reason: SDUPTHER

## 2020-01-15 RX ORDER — SODIUM CHLORIDE, POTASSIUM CHLORIDE, CALCIUM CHLORIDE, MAGNESIUM CHLORIDE, SODIUM ACETATE, AND SODIUM CITRATE 6.4; .75; .48; .3; 3.9; 1.7 MG/ML; MG/ML; MG/ML; MG/ML; MG/ML; MG/ML
SOLUTION IRRIGATION PRN
Status: DISCONTINUED | OUTPATIENT
Start: 2020-01-15 | End: 2020-01-15 | Stop reason: ALTCHOICE

## 2020-01-15 RX ORDER — SODIUM CHLORIDE, SODIUM LACTATE, POTASSIUM CHLORIDE, CALCIUM CHLORIDE 600; 310; 30; 20 MG/100ML; MG/100ML; MG/100ML; MG/100ML
INJECTION, SOLUTION INTRAVENOUS CONTINUOUS
Status: DISCONTINUED | OUTPATIENT
Start: 2020-01-15 | End: 2020-01-15 | Stop reason: HOSPADM

## 2020-01-15 RX ORDER — TIMOLOL MALEATE 5 MG/ML
SOLUTION/ DROPS OPHTHALMIC PRN
Status: DISCONTINUED | OUTPATIENT
Start: 2020-01-15 | End: 2020-01-15 | Stop reason: ALTCHOICE

## 2020-01-15 RX ORDER — SODIUM CHLORIDE 0.9 % (FLUSH) 0.9 %
10 SYRINGE (ML) INJECTION EVERY 12 HOURS SCHEDULED
Status: DISCONTINUED | OUTPATIENT
Start: 2020-01-15 | End: 2020-01-15 | Stop reason: HOSPADM

## 2020-01-15 RX ORDER — MAGNESIUM HYDROXIDE 1200 MG/15ML
LIQUID ORAL PRN
Status: DISCONTINUED | OUTPATIENT
Start: 2020-01-15 | End: 2020-01-15 | Stop reason: ALTCHOICE

## 2020-01-15 RX ORDER — TETRACAINE HYDROCHLORIDE 5 MG/ML
SOLUTION OPHTHALMIC PRN
Status: DISCONTINUED | OUTPATIENT
Start: 2020-01-15 | End: 2020-01-15 | Stop reason: ALTCHOICE

## 2020-01-15 RX ADMIN — Medication 0.3 ML: at 07:22

## 2020-01-15 RX ADMIN — FAMOTIDINE 20 MG: 10 INJECTION, SOLUTION INTRAVENOUS at 07:28

## 2020-01-15 RX ADMIN — Medication 0.3 ML: at 07:31

## 2020-01-15 RX ADMIN — SODIUM CHLORIDE, POTASSIUM CHLORIDE, SODIUM LACTATE AND CALCIUM CHLORIDE: 600; 310; 30; 20 INJECTION, SOLUTION INTRAVENOUS at 07:25

## 2020-01-15 RX ADMIN — PROPOFOL 100 MG: 10 INJECTION, EMULSION INTRAVENOUS at 08:35

## 2020-01-15 RX ADMIN — Medication 0.3 ML: at 07:12

## 2020-01-15 ASSESSMENT — PULMONARY FUNCTION TESTS
PIF_VALUE: 1
PIF_VALUE: 0
PIF_VALUE: 1
PIF_VALUE: 1
PIF_VALUE: 0
PIF_VALUE: 1
PIF_VALUE: 0
PIF_VALUE: 1

## 2020-01-15 ASSESSMENT — PAIN - FUNCTIONAL ASSESSMENT: PAIN_FUNCTIONAL_ASSESSMENT: 0-10

## 2020-01-15 ASSESSMENT — PAIN SCALES - GENERAL: PAINLEVEL_OUTOF10: 0

## 2020-01-15 NOTE — H&P
I have examined the patient and reviewed the history and physical and find no relevant changes. I have reviewed with the patient and/or family the risks, benefits, and alternatives to the procedure and they have agreed to proceed.     Jalil Figueroa.

## 2020-01-15 NOTE — BRIEF OP NOTE
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  Item Serial No.  Lot No. LRB No. Used   LENS IOL [id number] [facility] [id number] Eye LENS IOL [id number] [facility] [id number] [place]  Right 1         Drains: * No LDAs found *    Findings: none    Yaa Herring MD  Date: 1/15/2020  Time: 9:12 AM

## 2020-01-15 NOTE — OP NOTE
[address] [address]                                OPERATIVE REPORT    PATIENT NAME: Chan Flores                   :        1940  MED REC NO:   [id number]                          ROOM:  ACCOUNT NO:   [de-identified]                           ADMIT DATE: 01/15/2020  PROVIDER:     Gurmeet Hodges MD    DATE OF PROCEDURE:  01/15/2020    PREOPERATIVE DIAGNOSIS:  Cataract, right eye. POSTOPERATIVE DIAGNOSIS:  Cataract, right eye. OPERATION:  Phacoemulsification of the cataract of the right eye with a  posterior chamber lens implant. ANESTHESIA:  General / Monitored Anesthesia Care / Retrobulbar. A 2 mL  retrobulbar block and 10 mL modified [place] block using a 1:1 mixture  of 0.75% Marcaine, 4% Xylocaine with epinephrine, and hyaluronidase. SURGICAL INDICATIONS:  The patient has had a painless progressive loss  of vision due to the cataractous degeneration of the lens and for that  reason, surgery is indicated. The patient is having visual problems  with current lifestyle. A new eyeglasses prescription was unable to  adequately improve functional vision. DETAILS OF PROCEDURE:  The patient was taken to the operating room and  was prepped and draped in the usual manner after obtaining satisfactory  local anesthesia. Attention was turned towards the operative eye and a lid speculum was  inserted. A 2.5 mm keratome was used to make a limbal incision at 180  degrees. Viscoat was then placed in the eye to fill the anterior  chamber and a side port incision was made with a Superblade through the  clear cornea. The incision was located about 2 o'clock to the left of  the original incision. A bent needle was then used to make a cut in the  anterior capsule and start a capsulorrhexis tear. This was then grasped  with Utrata forceps and a 360 degree tear was completed.   [place]  dissection was then done with

## 2020-01-15 NOTE — ANESTHESIA PRE PROCEDURE
Component Value Date     12/17/2014    K 3.9 12/17/2014     12/17/2014    CO2 28 12/17/2014    BUN 14 12/17/2014    CREATININE 1.3 12/17/2014    GFRAA >60 12/17/2014    LABGLOM 57 12/17/2014    GLUCOSE 127 12/17/2014    CALCIUM 8.8 12/17/2014       POC Tests: No results for input(s): POCGLU, POCNA, POCK, POCCL, POCBUN, POCHEMO, POCHCT in the last 72 hours. Coags:   Lab Results   Component Value Date    PROTIME 9.8 12/09/2014    INR 0.91 12/09/2014    APTT 29.4 12/09/2014       HCG (If Applicable): No results found for: PREGTESTUR, PREGSERUM, HCG, HCGQUANT     ABGs: No results found for: PHART, PO2ART, WGN4QEW, FPX3OCR, BEART, F8DWSNPA     Type & Screen (If Applicable):  No results found for: [facility]    Anesthesia Evaluation  Patient summary reviewed and Nursing notes reviewed no history of anesthetic complications:   Airway: Mallampati: II  TM distance: >3 FB   Neck ROM: full  Mouth opening: > = 3 FB Dental: normal exam         Pulmonary:Negative Pulmonary ROS                              Cardiovascular:    (+) hypertension:,                   Neuro/Psych:   Negative Neuro/Psych ROS              GI/Hepatic/Renal: Neg GI/Hepatic/Renal ROS       (-) GERD, liver disease and no renal disease       Endo/Other: Negative Endo/Other ROS       (-) diabetes mellitus               Abdominal:           Vascular: negative vascular ROS. Anesthesia Plan      MAC     ASA 2       Induction: intravenous. Anesthetic plan and risks discussed with patient and child/children. Plan discussed with CRNA. All questions answered and agrees with plan.         Bozena Meek MD   1/15/2020

## 2020-01-28 NOTE — PROGRESS NOTES
Flakita     Age 67 y.o.    male    1940    N [id number]    2/5/2020  Arrival Time_____________  OR Time____________40 [address] Holes     Procedure(s):  PHACOEMULSIFICATION OF CATARACT LEFT EYE WITH INTRAOCULAR LENS IMPLANT                      Monitor Anesthesia Care    Surgeon(s):  Mo Wagner, MD       Phone [phone] ([place])     [address]  Cell Work  ______________________________________________________________________________________________________________________________________________________________________________________________________________________________________________________________________________________________________________________________________________________________    PCP _____________________________ Phone_________________       H&P__________________Bringing      Chart              Epic    DOS           Called_______  EKG__________________Bringing      Chart              Epic    DOS           Called_______  LAB__________________ Bringing      Chart              Epic    DOS           Called_______  CardiacClearance _______Bringing      Chart              Epic    DOS           Called_______      Cardiologist________________________ Phone___________________________      ? Cheondoism concerns / Waiver on Chart            PAT Communications________________  ? Pre-op Instructions Given [place]          _________________________________  ? Directions to Surgery Center                          _________________________________  ? Transportation Home_______________      _________________________________  ?  Crutches/Walker__________________        _________________________________      ________Pre-op Orders   _______Transcribed    _______Wt.  ________Pharmacy          _______SCD  ______VTE     ______Beta Blocker  ________Consent             ________TED Saul Veblen

## 2020-01-29 NOTE — PROGRESS NOTES
Obstructive Sleep Apnea (MARIE) Screening     Patient:  Jenn Gifford    YOB: 1940      Medical Record #:  [id number]                     Date:  1/29/2020     1. Are you a loud and/or regular snorer? []  Yes       [x] No    2. Have you been observed to gasp or stop breathing during sleep? []  Yes       [x] No    3. Do you feel tired or groggy upon awakening or do you awaken with a headache?           []  Yes       [] No    4. Are you often tired or fatigued during the wake time hours? []  Yes       [] No    5. Do you fall asleep sitting, reading, watching TV or driving? []  Yes       [] No    6. Do you often have problems with memory or concentration? []  Yes       [] No    **If patient's score is ? 3 they are considered high risk for MARIE. Notify the anesthesiologist of the high risk and document in focus note. Note:  If the patient's BMI is more than 35 kg m¯² , has neck circumference > 40 cm, and/or high blood pressure the risk is greater (© American Sleep Apnea Association, 2006).

## 2020-02-05 ENCOUNTER — ANESTHESIA (OUTPATIENT)
Dept: OPERATING ROOM | Age: 80
End: 2020-02-05
Payer: MEDICARE

## 2020-02-05 ENCOUNTER — HOSPITAL ENCOUNTER (OUTPATIENT)
Age: 80
Setting detail: OUTPATIENT SURGERY
Discharge: HOME | End: 2020-02-05
Attending: OPHTHALMOLOGY | Admitting: OPHTHALMOLOGY
Payer: MEDICARE

## 2020-02-05 ENCOUNTER — ANESTHESIA EVENT (OUTPATIENT)
Dept: OPERATING ROOM | Age: 80
End: 2020-02-05
Payer: MEDICARE

## 2020-02-05 VITALS — SYSTOLIC BLOOD PRESSURE: 130 MMHG | DIASTOLIC BLOOD PRESSURE: 71 MMHG | OXYGEN SATURATION: 96 %

## 2020-02-05 VITALS
DIASTOLIC BLOOD PRESSURE: 70 MMHG | BODY MASS INDEX: 25.67 KG/M2 | HEIGHT: 74 IN | WEIGHT: 200 LBS | TEMPERATURE: 97.6 F | HEART RATE: 54 BPM | SYSTOLIC BLOOD PRESSURE: 127 MMHG | RESPIRATION RATE: 16 BRPM | OXYGEN SATURATION: 99 %

## 2020-02-05 PROCEDURE — 7100000010 HC PHASE II RECOVERY - FIRST 15 MIN: Performed by: OPHTHALMOLOGY

## 2020-02-05 PROCEDURE — 3600000013 HC SURGERY LEVEL 3 ADDTL 15MIN: Performed by: OPHTHALMOLOGY

## 2020-02-05 PROCEDURE — 6360000002 HC RX W HCPCS: Performed by: NURSE ANESTHETIST, CERTIFIED REGISTERED

## 2020-02-05 PROCEDURE — 2580000003 HC RX 258: Performed by: OPHTHALMOLOGY

## 2020-02-05 PROCEDURE — 3700000001 HC ADD 15 MINUTES (ANESTHESIA): Performed by: OPHTHALMOLOGY

## 2020-02-05 PROCEDURE — 2709999900 HC NON-CHARGEABLE SUPPLY: Performed by: OPHTHALMOLOGY

## 2020-02-05 PROCEDURE — 7100000011 HC PHASE II RECOVERY - ADDTL 15 MIN: Performed by: OPHTHALMOLOGY

## 2020-02-05 PROCEDURE — 2580000003 HC RX 258: Performed by: ANESTHESIOLOGY

## 2020-02-05 PROCEDURE — 3700000000 HC ANESTHESIA ATTENDED CARE: Performed by: OPHTHALMOLOGY

## 2020-02-05 PROCEDURE — 3600000003 HC SURGERY LEVEL 3 BASE: Performed by: OPHTHALMOLOGY

## 2020-02-05 PROCEDURE — V2632 POST CHMBR INTRAOCULAR LENS: HCPCS | Performed by: OPHTHALMOLOGY

## 2020-02-05 PROCEDURE — 6370000000 HC RX 637 (ALT 250 FOR IP): Performed by: OPHTHALMOLOGY

## 2020-02-05 DEVICE — ACRYSOF(R) IQ ASPHERIC IOL SP ACRYLIC FOLDABLELENS WULTRASERT(TM) DELIVERY SYSTEM UV WBLUE LIGHT FILTER. 13.0MM LENGTH 6.0MM ANTERIORASYMMETRIC BICONVEX OPTIC PLANAR HAPTICS.
Type: IMPLANTABLE DEVICE | Site: EYE | Status: FUNCTIONAL
Brand: ACRYSOF ULTRASERT

## 2020-02-05 RX ORDER — LIDOCAINE HYDROCHLORIDE 10 MG/ML
0.3 INJECTION, SOLUTION EPIDURAL; INFILTRATION; INTRACAUDAL; PERINEURAL
Status: DISCONTINUED | OUTPATIENT
Start: 2020-02-05 | End: 2020-02-05 | Stop reason: HOSPADM

## 2020-02-05 RX ORDER — SODIUM CHLORIDE, SODIUM LACTATE, POTASSIUM CHLORIDE, CALCIUM CHLORIDE 600; 310; 30; 20 MG/100ML; MG/100ML; MG/100ML; MG/100ML
INJECTION, SOLUTION INTRAVENOUS CONTINUOUS
Status: DISCONTINUED | OUTPATIENT
Start: 2020-02-05 | End: 2020-02-05 | Stop reason: HOSPADM

## 2020-02-05 RX ORDER — SODIUM CHLORIDE 0.9 % (FLUSH) 0.9 %
10 SYRINGE (ML) INJECTION EVERY 12 HOURS SCHEDULED
Status: DISCONTINUED | OUTPATIENT
Start: 2020-02-05 | End: 2020-02-05 | Stop reason: HOSPADM

## 2020-02-05 RX ORDER — MAGNESIUM HYDROXIDE 1200 MG/15ML
LIQUID ORAL PRN
Status: DISCONTINUED | OUTPATIENT
Start: 2020-02-05 | End: 2020-02-05 | Stop reason: ALTCHOICE

## 2020-02-05 RX ORDER — SODIUM CHLORIDE 0.9 % (FLUSH) 0.9 %
10 SYRINGE (ML) INJECTION PRN
Status: DISCONTINUED | OUTPATIENT
Start: 2020-02-05 | End: 2020-02-05 | Stop reason: HOSPADM

## 2020-02-05 RX ORDER — PROPOFOL 10 MG/ML
INJECTION, EMULSION INTRAVENOUS PRN
Status: DISCONTINUED | OUTPATIENT
Start: 2020-02-05 | End: 2020-02-05 | Stop reason: SDUPTHER

## 2020-02-05 RX ADMIN — Medication 0.3 ML: at 07:35

## 2020-02-05 RX ADMIN — Medication 0.3 ML: at 07:44

## 2020-02-05 RX ADMIN — PROPOFOL 80 MG: 10 INJECTION, EMULSION INTRAVENOUS at 08:32

## 2020-02-05 RX ADMIN — SODIUM CHLORIDE, POTASSIUM CHLORIDE, SODIUM LACTATE AND CALCIUM CHLORIDE: 600; 310; 30; 20 INJECTION, SOLUTION INTRAVENOUS at 07:35

## 2020-02-05 RX ADMIN — Medication 0.3 ML: at 07:25

## 2020-02-05 ASSESSMENT — PULMONARY FUNCTION TESTS
PIF_VALUE: 0
PIF_VALUE: 0
PIF_VALUE: 1
PIF_VALUE: 0
PIF_VALUE: 1
PIF_VALUE: 0

## 2020-02-05 ASSESSMENT — PAIN SCALES - GENERAL: PAINLEVEL_OUTOF10: 0

## 2020-02-05 ASSESSMENT — PAIN - FUNCTIONAL ASSESSMENT: PAIN_FUNCTIONAL_ASSESSMENT: 0-10

## 2020-02-05 NOTE — ANESTHESIA PRE PROCEDURE
Department of Anesthesiology  Preprocedure Note       Name:  Lani Ace   Age:  67 y.o.  :  1940                                          MRN:  [id number]         Date:  2020      Surgeon: Christa Boast):  Aamir Rousseau MD    Procedure: PHACOEMULSIFICATION OF CATARACT LEFT EYE WITH INTRAOCULAR LENS IMPLANT (Left Eye)    Medications prior to admission:   Prior to Admission medications    Medication Sig Start Date End Date Taking? Authorizing Provider   atorvastatin (LIPITOR) 10 MG tablet Take 10 mg by mouth daily   Yes Historical Provider, MD   Coenzyme Q10 (CO Q-10) 100 MG CAPS Take by mouth   Yes Historical Provider, MD   Cholecalciferol (VITAMIN D) 50 MCG (2000) CAPS capsule Take by mouth   Yes Historical Provider, MD   aspirin 81 MG tablet Take 81 mg by mouth daily. Yes Historical Provider, MD   lisinopril (PRINIVIL;ZESTRIL) 10 MG tablet Take 10 mg by mouth daily. Yes Historical Provider, MD   allopurinol (ZYLOPRIM) 300 MG tablet Take 300 mg by mouth daily.    Yes Historical Provider, MD   sildenafil (VIAGRA) 25 MG tablet Take 25 mg by mouth as needed for Erectile Dysfunction    Historical Provider, MD       Current medications:    Current Facility-Administered Medications   Medication Dose Route Frequency Provider Last Rate Last Dose    bupivacaine 0.75%, phenylephrine 10%, tropicamide 1%, cyclopentolate 1%, moxifloxacin 0.5%, ketorolac 0.5% in lidocaine 2% jelly ophthalmic solution  0.3 mL Left Eye Q10 Min PRN Amair Rousseau MD   0.3 mL at 20 0735    lactated ringers infusion   Intravenous Continuous Berenice Montoya  mL/hr at 20 0735      sodium chloride flush 0.9 % injection 10 mL  10 mL Intravenous 2 times per day Berenice Montoya MD        sodium chloride flush 0.9 % injection 10 mL  10 mL Intravenous PRN Berenice Montoya MD        lidocaine PF 1 % injection 0.3 mL  0.3 mL Intradermal Once PRN Berenice Montoya MD           Allergies:  No Known Allergies    Problem List:

## 2020-02-05 NOTE — PROGRESS NOTES
Left message for patient to return call.    Lens ID card and Postop opthalmic kit sent with patient to PACU.

## 2020-02-05 NOTE — OP NOTE
[facility]                 [facility] [place]                                 OPERATIVE REPORT    PATIENT NAME: Colby Rodriguez                   :        1940  MED REC NO:   [id number]                          ROOM:  ACCOUNT NO:   [de-identified]                           ADMIT DATE: 2020  PROVIDER:     Kwan Treviño MD    DATE OF PROCEDURE:  2020    PREOPERATIVE DIAGNOSIS:  Cataract, left eye. POSTOPERATIVE DIAGNOSIS:  Cataract, left eye. OPERATION:  Phacoemulsification of the cataract of the left eye with a  posterior chamber lens implant. ANESTHESIA:  General / Monitored Anesthesia Care / Retrobulbar. A 2 mL  retrobulbar block and 10 mL modified [place] block using a 1:1 mixture  of 0.75% Marcaine, 4% Xylocaine with epinephrine, and hyaluronidase. SURGICAL INDICATIONS:  The patient has had a painless progressive loss  of vision due to the cataractous degeneration of the lens and for that  reason, surgery is indicated. The patient is having visual problems  with current lifestyle. A new eyeglasses prescription was unable to  adequately improve functional vision. DETAILS OF PROCEDURE:  The patient was taken to the operating room and  was prepped and draped in the usual manner after obtaining satisfactory  local anesthesia. Attention was turned towards the operative eye and a lid speculum was  inserted. A 2.5 mm keratome was used to make a limbal incision at 180  degrees. Viscoat was then placed in the eye to fill the anterior  chamber and a side port incision was made with a Superblade through the  clear cornea. The incision was located about 2 o'clock to the left of  the original incision. A bent needle was then used to make a cut in the  anterior capsule and start a capsulorrhexis tear. This was then grasped  with Utrata forceps and a 360 degree tear was completed.   [place]  dissection was then done with BSS

## 2023-11-02 NOTE — FLOWSHEET NOTE
[facility], [address] [place] [address] [place]  Phone: [phone]  Fax [phone]    Physical Therapy Daily Treatment Note  Date:  10/23/2017    Patient Name:  Sebastian Olsen    :  1940  MRN: [id number]  Restrictions/Precautions:    Medical/Treatment Diagnosis Information:  Diagnosis: R ant THR 17   Treatment Diagnosis: R hip pain F93.345  Insurance/Certification information:  PT Insurance Information: medicare   Physician Information:  Referring Practitioner: Dr. Dhara Wang of care signed (Y/N): faxed; POC expires 17     Date of Patient follow up with Physician:      G-Code (if applicable):      Date G-Code Applied:  LEFS 51% 17        Progress Note: []  Yes  [x]  No  Next due by: Visit #10       Latex Allergy:  [x]NO      []YES  Preferred Language for Healthcare:   [x]English       []other:    Visit # Insurance Allowable   7 BMN      Pain level:  0/10 R hip     SUBJECTIVE:   Pt reports his hip is cont to get better.  He put a little bit on the golf course over the weekend     OBJECTIVE: 4.5 weeks post op  Observation:   Test measurements:   Cautioned patient against performing twisting motions at the hip     RESTRICTIONS/PRECAUTIONS: R TKR, hx of LBP,     Exercises/Interventions:     Therapeutic Ex Sets/sec Reps Notes   Supine knee flex with belt, with lower leg off table  10\"x10   Hep    Supine TrA with bridge with hip abd with SD  5\" 2x10   Hep    Supine SB knee flex   10\"x10   Hep    Sitting HS and gastroc stretch  3x30\" ea   Hep    Supine bridge with SB        Mini squat on dyno disc  5\" 2x10      saq       Prone HS curl  4# 5\" 2x10      Side stepping with TB      2 laps OTB  At ankles      LAQ  5\" 2x10 4#      Prone quad stretch with belt  10\"x10      SL hip abd  3\" 2x10   Hep 10/16/17    Bike  5 min warm up      Standing incline gastroc stretch  3x30\"      Manual Intervention      R hip PROM, HS stretch,  stretch ; prone quad stretch, supine psoas stretch with thigh on mat ; stick massage to R  15 min                                   NMR re-education      Standing weight shifts on airex       tandem on airex  5\" x10   Challenging    SLS  airex  5\" 2x10      FSU and LSU  2x10 ea 6\"      Standing TKE with TB  BTB 5\" 2x10                Therapeutic Exercise and NMR EXR  [x] (43918) Provided verbal/tactile cueing for activities related to strengthening, flexibility, endurance, ROM for improvements in LE, proximal hip, and core control with self care, mobility, lifting, ambulation. [x] (36265) Provided verbal/tactile cueing for activities related to improving balance, coordination, kinesthetic sense, posture, motor skill, proprioception  to assist with LE, proximal hip, and core control in self care, mobility, lifting, ambulation and eccentric single leg control.      NMR and Therapeutic Activities:    [] (06100 or 89088) Provided verbal/tactile cueing for activities related to improving balance, coordination, kinesthetic sense, posture, motor skill, proprioception and motor activation to allow for proper function of core, proximal hip and LE with self care and ADLs  [x] (05165) Gait Re-education- Provided training and instruction to the patient for proper LE, core and proximal hip recruitment and positioning and eccentric body weight control with ambulation re-education including up and down stairs     Home Exercise Program:    [x] (77726) Reviewed/Progressed HEP activities related to strengthening, flexibility, endurance, ROM of core, proximal hip and LE for functional self-care, mobility, lifting and ambulation/stair navigation   [] (55174)Reviewed/Progressed HEP activities related to improving balance, coordination, kinesthetic sense, posture, motor skill, proprioception of core, proximal hip and LE for self care, mobility, lifting, and ambulation/stair navigation      Manual Treatments:  PROM / STM / Oscillations-Mobs:  G-I, II, III, IV (PA's, Inf., Post.)  [x] (32394) Provided manual therapy to mobilize LE, proximal hip and/or LS spine soft tissue/joints for the purpose of modulating pain, promoting relaxation,  increasing ROM, reducing/eliminating soft tissue swelling/inflammation/restriction, improving soft tissue extensibility and allowing for proper ROM for normal function with self care, mobility, lifting and ambulation. Modalities:   pt declined will ice at home     Charges:  Timed Code Treatment Minutes: 45   Total Treatment Minutes: 45     [] EVAL (LOW) 63693 (typically 20 minutes face-to-face)  [] EVAL (MOD) 92430 (typically 30 minutes face-to-face)  [] EVAL (HIGH) 95457 (typically 45 minutes face-to-face)  [] RE-EVAL     [x] NL(15980) x  1   [] IONTO  [x] NMR (73381) x  1   [] VASO  [x] Manual (11159) x  1    [] Other:  [] TA x       [] Mech Traction (30246)  [] ES(attended) (47466)      [] ES (un) (97043):     GOALS:   Short Term Goals: To be achieved in: 2 weeks  1. Independent in HEP and progression per patient tolerance, in order to prevent re-injury. 2. Patient will have a decrease in pain to facilitate improvement in movement, function, and ADLs as indicated by Functional Deficits. Long Term Goals: To be achieved in: 8 weeks  1. Disability index score of 29% or less for the LEFS to assist with reaching prior level of function. 2. Patient will demonstrate increased AROM to R hip flex 100°, abd to 35° to allow for proper joint functioning as indicated by patients Functional Deficits. 3. Patient will demonstrate an increase in Strength to in LE to allow for proper functional mobility as indicated by patients Functional Deficits. 4. Patient will return to ambulating with proper gait without an AD, ascend and descend stairs with reciprocal without increased symptoms or restriction.     Progression Towards Functional goals:  [x] Patient is progressing as expected towards functional goals Declines

## 2024-02-21 ENCOUNTER — HOSPITAL ENCOUNTER (OUTPATIENT)
Dept: PHYSICAL THERAPY | Age: 84
Setting detail: THERAPIES SERIES
Discharge: HOME | End: 2024-02-21
Payer: MEDICARE

## 2024-02-21 PROCEDURE — 97163 PT EVAL HIGH COMPLEX 45 MIN: CPT

## 2024-02-21 NOTE — THERAPY EVALUATION
Assessed 02/21/2024   Measure Used LEFS   Disability Score (rawscore/%) 50% disability      Occupation/School:  Work/School Status: Retired  Job Duties/Demands: NA    Sport/ Recreation/ Leisure/ Hobbies: Golf, hand ball, walking    Review Of Systems (ROS):  [x] Performed Review of systems (Integumentary, CardioPulmonary, Neurological) by intake and observation. Intake form has been scanned into medical record. Patient has been instructed to contact their primary care physician regarding ROS issues if not already being addressed at this time.    [x] Patient history, allergies, meds reviewed. Medical chart reviewed. See intake form.     Co-morbidities/Complexities (which will affect course of rehabilitation):  []None        Arthritic conditions  [] Rheumatoid arthritis (M05.9)  [] Osteoarthritis (M19.91)  [] Gout      Neurological conditions  [] Prior Stroke (I69.30)  [] Parkinson's (G20)  [] Encephalopathy (G93.40)  [] Multiple Sclerosis (G35)  [] Post-polio (G14)  [] Spinal cord injury (SCI)  [] Traumatic brain injury (TBI)  [] ALS    Gastrointestinal conditions   [] Constipation (K59.00)  [] Chron's/ulcerative colitis    Endocrine conditions   [] Hypothyroid (E03.9)  [] Hyperthyroid [x] Hx of COVID    Musculoskeletal conditions  [] Disc pathology   [] Congenital spine pathologies   [] Osteoporosis (M81.8)  [] Osteopenia (M85.8)  [] Scoliosis    Cardio/Pulmonary conditions  [] Asthma (J45)  [] Coughing   [] COPD (J44.9)  [] CHF  [] A-fib      Rheumatological conditions  [] Fibromyalgia (M79.7)  [] Lupus  [] Sjogrens  [] Ankylosing spondylitis  [] Other autoimmune       Metabolic conditions  [] Morbid obesity (E66.01)  [] Diabetes type 1(E10.65) or 2 (E11.65)   [] Neuropathy (G60.9)        Cardiovascular conditions  [x] Hypertension (I10)  [] Hyperlipidemia (E78.5)  [] Angina pectoris (I20)  [] Atherosclerosis (I70)  [] Pacemaker/Defib  [] Hx of CABG/stent/cardiac surgeries    Developmental Disorders  [] Autism

## 2024-02-21 NOTE — FLOWSHEET NOTE
[facility]- Outpatient Rehabilitation and Therapy  [address], [place] [address] office: [phone] fax: [phone]      Physical Therapy: TREATMENT/PROGRESS NOTE   Patient: Eder Obrien (83 y.o. 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]  Neck pain [M54.2]   Referring Physician: Dayanna Carl, *  PCP: Chaparro Watson MD                             Plan of care signed: NO    Date of Patient follow up with Physician:      Progress Report/POC: EVAL today  POC update due: (10 visits /OR AUTH LIMITS, whichever is less) 10 3/22/2024     Precautions/ Contra-indications:                                                                                          Latex allergy:  NO  Pacemaker:    NO  Contraindications for Manipulation: None  Date of Surgery: N/A  Other:      Red Flags:  None    Preferred Language for Healthcare:   [x]English       []other:    SUBJECTIVE EXAMINATION     Patient Report/Comments:  patient has been having issues with his gait. He has not had any falls within the last year or two because he tries to be as safe as possible when walking, however, he has fallen prior to that. In the past, he has tried balance therapy which has improved balance it is still progressively more difficult. He feels no pain, only stiffness that is significantly worse in the morning or when he has not moved for a while. His main areas of tightness are in the neck and the back with \"burning\" in the

## 2024-02-27 ENCOUNTER — HOSPITAL ENCOUNTER (OUTPATIENT)
Dept: PHYSICAL THERAPY | Age: 84
Setting detail: THERAPIES SERIES
Discharge: HOME | End: 2024-02-27
Payer: MEDICARE

## 2024-02-27 PROCEDURE — 97140 MANUAL THERAPY 1/> REGIONS: CPT

## 2024-02-27 PROCEDURE — 97110 THERAPEUTIC EXERCISES: CPT

## 2024-02-27 PROCEDURE — 97112 NEUROMUSCULAR REEDUCATION: CPT

## 2024-02-27 NOTE — FLOWSHEET NOTE
Adjusted    Overall Progression Towards Functional goals/ Treatment Progress Update:  [] Patient is progressing as expected towards functional goals listed.    [] Progression is slowed due to complexities/Impairments listed.  [] Progression has been slowed due to co-morbidities.  [x] Plan just implemented, too soon (<30days) to assess goals progression   [] Goals require adjustment due to lack of progress  [] Patient is not progressing as expected and requires additional follow up with physician  [] Other:     TREATMENT PLAN   Plan: Frequency/Duration: 2x/week for 4-6 weeks for the following treatment interventions:     Interventions:  [x] Therapeutic exercise including: strength training, ROM, including postural re-education.   [x] NMR activation and proprioception, including postural re-education.    [x] Manual therapy as indicated to include: PROM, Gr I-IV mobilizations, and STM  [x] Modalities as needed that may include: NONE  [x] Patient education on joint protection, postural re-education, activity modification, progression of HEP.        [] Aquatic Therapy     Electronically Signed by Lex Woods, PT              Date: 02/27/2024     Note: If patient does not return for scheduled/recommended follow up visits, this note will serve as a discharge from care along with the most recent update on progress.

## 2024-02-28 ENCOUNTER — APPOINTMENT (OUTPATIENT)
Dept: PHYSICAL THERAPY | Age: 84
End: 2024-02-28
Payer: MEDICARE

## 2024-03-04 ENCOUNTER — HOSPITAL ENCOUNTER (OUTPATIENT)
Dept: PHYSICAL THERAPY | Age: 84
Setting detail: THERAPIES SERIES
Discharge: HOME | End: 2024-03-04
Payer: MEDICARE

## 2024-03-04 PROCEDURE — 97110 THERAPEUTIC EXERCISES: CPT

## 2024-03-04 PROCEDURE — 97140 MANUAL THERAPY 1/> REGIONS: CPT

## 2024-03-04 PROCEDURE — 97112 NEUROMUSCULAR REEDUCATION: CPT

## 2024-03-04 NOTE — FLOWSHEET NOTE
[facility]- Outpatient Rehabilitation and Therapy  [address], [place] [address] office: [phone] fax: [phone]      Physical Therapy: TREATMENT/PROGRESS NOTE   Patient: Eder Obrien (83 y.o. 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]  Neck pain [M54.2]   Referring Physician: Dayanna Carl, *  PCP: Malik Zhong MD                             Plan of care signed: NO    Date of Patient follow up with Physician:      Progress Report/POC: EVAL today  POC update due: (10 visits /OR AUTH LIMITS, whichever is less) 10 4/3/2024     Precautions/ Contra-indications:                                                                                          Latex allergy:  NO  Pacemaker:    NO  Contraindications for Manipulation: None  Date of Surgery: N/A  Other:      Red Flags:  None    Preferred Language for Healthcare:   [x]English       []other:    SUBJECTIVE EXAMINATION     Patient Report/Comments:  Pt reports waking up with extra morning stiffness today but not increased pain.     Test used Initial score  2024   Pain Summary VAS 0/10    Functional questionnaire LEFS 50% disability    Other:                OBJECTIVE EXAMINATION     Observation: See Eval    Test measurements: See Eval    Exercises/Interventions:     Therapeutic Ex (05285)  resistance Sets/time Reps Notes/Cues/Progressions   TA set      BKFO      Marching  3 breaths 10    Bridge  1 breath 10    Santosh GALO     Mini

## 2024-03-06 ENCOUNTER — HOSPITAL ENCOUNTER (OUTPATIENT)
Dept: PHYSICAL THERAPY | Age: 84
Setting detail: THERAPIES SERIES
Discharge: HOME | End: 2024-03-06
Payer: MEDICARE

## 2024-03-06 PROCEDURE — 97110 THERAPEUTIC EXERCISES: CPT

## 2024-03-06 PROCEDURE — 97140 MANUAL THERAPY 1/> REGIONS: CPT

## 2024-03-06 PROCEDURE — 97112 NEUROMUSCULAR REEDUCATION: CPT

## 2024-03-06 NOTE — FLOWSHEET NOTE
[facility]- Outpatient Rehabilitation and Therapy  [address], [place] [address] office: [phone] fax: [phone]      Physical Therapy: TREATMENT/PROGRESS NOTE   Patient: Eder Obrien (83 y.o. 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]  Neck pain [M54.2]   Referring Physician: Dayanna Carl, *  PCP: Malik Zhong MD                             Plan of care signed: NO    Date of Patient follow up with Physician:      Progress Report/POC: EVAL today  POC update due: (10 visits /OR AUTH LIMITS, whichever is less) 10 2024     Precautions/ Contra-indications:                                                                                          Latex allergy:  NO  Pacemaker:    NO  Contraindications for Manipulation: None  Date of Surgery: N/A  Other:      Red Flags:  None    Preferred Language for Healthcare:   [x]English       []other:    SUBJECTIVE EXAMINATION     Patient Report/Comments:  Pt reports that he is less stiff than last week.     Test used Initial score  2024   Pain Summary VAS 0/10    Functional questionnaire LEFS 50% disability    Other:                OBJECTIVE EXAMINATION     Observation: See Eval    Test measurements: See Eval    Exercises/Interventions:     Therapeutic Ex (85428)  resistance Sets/time Reps Notes/Cues/Progressions   TA set      BKFO      Marching  3 breaths 10    Bridge  1 breath 10    Clamshell  NV     Mini squat  NV     DKTC   20    LTR   10

## 2024-03-11 ENCOUNTER — HOSPITAL ENCOUNTER (OUTPATIENT)
Dept: PHYSICAL THERAPY | Age: 84
Setting detail: THERAPIES SERIES
Discharge: HOME | End: 2024-03-11
Payer: MEDICARE

## 2024-03-11 PROCEDURE — 97140 MANUAL THERAPY 1/> REGIONS: CPT

## 2024-03-11 PROCEDURE — 97110 THERAPEUTIC EXERCISES: CPT

## 2024-03-13 ENCOUNTER — HOSPITAL ENCOUNTER (OUTPATIENT)
Dept: PHYSICAL THERAPY | Age: 84
Setting detail: THERAPIES SERIES
Discharge: HOME | End: 2024-03-13
Payer: MEDICARE

## 2024-03-13 PROCEDURE — 97110 THERAPEUTIC EXERCISES: CPT

## 2024-03-13 PROCEDURE — 97112 NEUROMUSCULAR REEDUCATION: CPT

## 2024-03-13 NOTE — FLOWSHEET NOTE
[facility]- Outpatient Rehabilitation and Therapy  [address], [place] [address] office: [phone] fax: [phone]      Physical Therapy: TREATMENT/PROGRESS NOTE   Patient: Eder Obrien (83 y.o. 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]  Neck pain [M54.2]   Referring Physician: Dayanna Carl, *  PCP: Malik Zhong MD                             Plan of care signed: NO    Date of Patient follow up with Physician:      Progress Report/POC: EVAL today  POC update due: (10 visits /OR AUTH LIMITS, whichever is less) 10 2024     Precautions/ Contra-indications:                                                                                          Latex allergy:  NO  Pacemaker:    NO  Contraindications for Manipulation: None  Date of Surgery: N/A  Other:      Red Flags:  None    Preferred Language for Healthcare:   [x]English       []other:    SUBJECTIVE EXAMINATION     Patient Report/Comments:  Pt reports that he is improving, especially with posture. Morning routine is the most helpful for stiffness.     Test used Initial score  2024   Pain Summary VAS 0/10    Functional questionnaire LEFS 50% disability    Other:                OBJECTIVE EXAMINATION     Observation: See Eval    Test measurements: See Eval    Exercises/Interventions:     Therapeutic Ex (37983)  resistance Sets/time Reps Notes/Cues/Progressions   TA set      BKFO      Marching       Bridge       Clamshell   10

## 2024-03-18 ENCOUNTER — HOSPITAL ENCOUNTER (OUTPATIENT)
Dept: PHYSICAL THERAPY | Age: 84
Setting detail: THERAPIES SERIES
Discharge: HOME | End: 2024-03-18
Payer: MEDICARE

## 2024-03-18 PROCEDURE — 97112 NEUROMUSCULAR REEDUCATION: CPT

## 2024-03-18 PROCEDURE — 97110 THERAPEUTIC EXERCISES: CPT

## 2024-03-18 NOTE — FLOWSHEET NOTE
[facility]- Outpatient Rehabilitation and Therapy  [address], [place] [address] office: [phone] fax: [phone]      Physical Therapy: TREATMENT/PROGRESS NOTE   Patient: Eder Obrien (83 y.o. 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]  Neck pain [M54.2]   Referring Physician: Dayanna Carl, *  PCP: Malik Zhong MD                             Plan of care signed: NO    Date of Patient follow up with Physician:      Progress Report/POC: EVAL today  POC update due: (10 visits /OR AUTH LIMITS, whichever is less) 10 2024     Precautions/ Contra-indications:                                                                                          Latex allergy:  NO  Pacemaker:    NO  Contraindications for Manipulation: None  Date of Surgery: N/A  Other:      Red Flags:  None    Preferred Language for Healthcare:   [x]English       []other:    SUBJECTIVE EXAMINATION     Patient Report/Comments:  Pt reports that he is improving, especially with posture. Morning routine is the most helpful for stiffness.     Test used Initial score  2024   Pain Summary VAS 0/10    Functional questionnaire LEFS 50% disability    Other:                OBJECTIVE EXAMINATION     Observation: See Eval    Test measurements: See Eval    Exercises/Interventions:     Therapeutic Ex (01038)  resistance Sets/time Reps Notes/Cues/Progressions   TA set      BKFO      Marching       Bridge       [facility]

## 2024-03-20 ENCOUNTER — HOSPITAL ENCOUNTER (OUTPATIENT)
Dept: PHYSICAL THERAPY | Age: 84
Setting detail: THERAPIES SERIES
Discharge: HOME | End: 2024-03-20
Payer: MEDICARE

## 2024-03-20 PROCEDURE — 97112 NEUROMUSCULAR REEDUCATION: CPT

## 2024-03-20 PROCEDURE — 97110 THERAPEUTIC EXERCISES: CPT

## 2024-03-20 NOTE — FLOWSHEET NOTE
Bridge       Clamshell       Mini squat  2 10    DKTC       LTR       Hip stacking       Hip abd 30#  75# 2  2 10  10 Hip machine   Chin tuck       Wall slide w/ chin tuck   10 Back extension for morning routine   Lat/pec stretching    pec   3 way ball compression       Lateral stepping with green TB   15' 6    NuStep L4 6'     TG  6'     Manual Intervention (76813)  TIME     thoracic/lumbar PA's gr3-4; CTJ sitting gr3-4;  STM B cervical paraspinals, B pec stretch and lat stretching.                                   NMR re-education (25457) resistance Sets/time Reps CUES NEEDED          Rhomberg Balance  30 sec 2 Airex, focus on posture, EC   Tandem balance  30 sec 4 Focus on posture, TA set, chin tuck   Turning in circles   10 In II bars   Retro walking  20' 3 In II bars   Stair tapping 6\" step   10 ea SL balance and control   Ball rebound  2 10 2# ball          Therapeutic Activity (43167)  Sets/time                                          Modalities:    No modalities applied this session    Education/Home Exercise Program:  [id number] (printed)      ASSESSMENT     Today's Assessment: Pt consistently doing well with improved balance and amb.  Continue to progress balance training with dynamic challenge.      Medical Necessity Documentation:  I certify that this patient meets the below criteria necessary for medical necessity for care and/or justification of therapy services:  The patient has functional impairments and/or activity limitations and would benefit from continued outpatient therapy services to address the deficits outlined in the patients goals    Treatment/Activity Tolerance:  [x] Patient tolerated treatment well [] Patient limited by fatique  [] Patient limited by pain  [] Patient limited by other medical complications  [] Other:     Return to Play: NA    Prognosis for POC: [x] Good [] Fair  [] Poor    Patient requires continued skilled intervention: [x] Yes  [] No    GOALS     Patient

## 2024-03-25 ENCOUNTER — HOSPITAL ENCOUNTER (OUTPATIENT)
Dept: PHYSICAL THERAPY | Age: 84
Setting detail: THERAPIES SERIES
Discharge: HOME | End: 2024-03-25
Payer: MEDICARE

## 2024-03-25 PROCEDURE — 97112 NEUROMUSCULAR REEDUCATION: CPT

## 2024-03-25 PROCEDURE — 97110 THERAPEUTIC EXERCISES: CPT

## 2024-03-25 NOTE — FLOWSHEET NOTE
sports/hobbies, and reduce stiffness (especially in the morning)  Status: [] Progressing: [] Met: [] Not Met: [] Adjusted     Therapist goals for Patient:   Short Term Goals: To be achieved in: 2 weeks  Independent in HEP and progression per patient tolerance, in order to progress toward full function and combat daily stiffness/inflammation.              Status: [] Progressing: [x] Met: [] Not Met: [] Adjusted  Patient will demonstrate the proper use of a SPC during gait for at least 100 ft in order to adjust to new usage of an AD throughout ADL's, functional activity, and rehab process.               Status: [] Progressing: [x] Met: [] Not Met: [] Adjusted     Long Term Goals: To be achieved in:  8-12  weeks  Disability index score of 20% or less for the LEFS to assist with return top prior level of function.                  Status: [x] Progressing: [] Met: [] Not Met: [] Adjusted  Improve hip AROM Flexion, ER, and IR to WFL or symmetrical to allow for proper joint functioning as indicated by patients functional deficits.  Status: [x] Progressing: [] Met: [] Not Met: [] Adjusted  Pt to improve strength to 4/5 or better of posterior chain LE and deep hip rotators to allow for proper muscle and joint use in functional mobility, and prior level of function.  Status: [x] Progressing: [] Met: [] Not Met: [] Adjusted  Patient will return to walking up to 1 mile with a SPC/hiking pole without increased symptoms or restriction to work towards return to prior level of function and exercise habits.  Status: [] Progressing: [x] Met: [] Not Met: [] Adjusted  Patient will be able to tolerate tandem stance on a foam pad for at least 30 seconds to demonstrate balance improvement for gait, exercise, and hobbies.  Status: [] Progressing: [x] Met: [] Not Met: [] Adjusted    Overall Progression Towards Functional goals/ Treatment Progress Update:  [x] Patient is progressing as expected towards functional goals listed.    []  same name as above

## 2024-03-27 ENCOUNTER — HOSPITAL ENCOUNTER (OUTPATIENT)
Dept: PHYSICAL THERAPY | Age: 84
Setting detail: THERAPIES SERIES
Discharge: HOME | End: 2024-03-27
Payer: MEDICARE

## 2024-03-27 PROCEDURE — 97110 THERAPEUTIC EXERCISES: CPT

## 2024-03-27 PROCEDURE — 97112 NEUROMUSCULAR REEDUCATION: CPT

## 2024-03-27 NOTE — PROGRESS NOTES
[facility]- Outpatient Rehabilitation and Therapy  [address], [place] [address] office: [phone] fax: [phone]      Physical Therapy: TREATMENT/PROGRESS NOTE   Patient: Eder Obrien (83 y.o. 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]  Neck pain [M54.2]   Referring Physician: Dayanna Carl, *  PCP: Malik Zhong MD                             Plan of care signed: NO    Date of Patient follow up with Physician:      Progress Report/POC: EVAL today  POC update due: (10 visits /OR AUTH LIMITS, whichever is less) 10 2024     Precautions/ Contra-indications:                                                                                          Latex allergy:  NO  Pacemaker:    NO  Contraindications for Manipulation: None  Date of Surgery: N/A  Other:      Red Flags:  None    Preferred Language for Healthcare:   [x]English       []other:    SUBJECTIVE EXAMINATION     Patient Report/Comments: Pt reports that he feels significantly better since the beginning of his therapy. Notes improvement especially in balance and his morning routine helps with stiffness. He also has no pain. He says his gait has definitely improved but he would like it be improved more. He is interested in continuing to improve balance and function once a week for at least 4 more weeks.     Test used Initial score  2024   Pain Summary VAS 0/10 0/10   Functional questionnaire LEFS 50% disability 29% disability

## 2024-04-01 ENCOUNTER — APPOINTMENT (OUTPATIENT)
Dept: PHYSICAL THERAPY | Age: 84
End: 2024-04-01
Payer: MEDICARE

## 2024-04-03 ENCOUNTER — HOSPITAL ENCOUNTER (OUTPATIENT)
Dept: PHYSICAL THERAPY | Age: 84
Setting detail: THERAPIES SERIES
Discharge: HOME | End: 2024-04-03
Payer: MEDICARE

## 2024-04-03 PROCEDURE — 97112 NEUROMUSCULAR REEDUCATION: CPT

## 2024-04-03 PROCEDURE — 97110 THERAPEUTIC EXERCISES: CPT

## 2024-04-03 NOTE — FLOWSHEET NOTE
[facility]- Outpatient Rehabilitation and Therapy  [address], [place] [address] office: [phone] fax: [phone]      Physical Therapy: TREATMENT/PROGRESS NOTE   Patient: Eder Obrien (83 y.o. 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]  Neck pain [M54.2]   Referring Physician: Dayanna Carl, *  PCP: Malik Zhong MD                             Plan of care signed: NO    Date of Patient follow up with Physician:      Progress Report/POC: EVAL today  POC update due: (10 visits /OR AUTH LIMITS, whichever is less) 10 5/3/2024     Precautions/ Contra-indications:                                                                                          Latex allergy:  NO  Pacemaker:    NO  Contraindications for Manipulation: None  Date of Surgery: N/A  Other:      Red Flags:  None    Preferred Language for Healthcare:   [x]English       []other:    SUBJECTIVE EXAMINATION     Patient Report/Comments: Pt reports that he feels significantly better since the beginning of his therapy.      Test used Initial score  2024   Pain Summary VAS 0/10 0/10   Functional questionnaire LEFS 50% disability 29% disability   Other:                OBJECTIVE EXAMINATION     Test measurements:     *unbolded values are from initial evaluation, bolded = new as of 3/27/24*  Range Tested AROM PROM MMT/Resisted     Left Right Left Right Left Right   Hip Flexion   100     4- 4+ 4- 4+   Hip Abduction         3- 3+

## 2024-04-12 ENCOUNTER — HOSPITAL ENCOUNTER (OUTPATIENT)
Dept: PHYSICAL THERAPY | Age: 84
Setting detail: THERAPIES SERIES
Discharge: HOME | End: 2024-04-12
Payer: MEDICARE

## 2024-04-12 PROCEDURE — 97112 NEUROMUSCULAR REEDUCATION: CPT

## 2024-04-12 PROCEDURE — 97110 THERAPEUTIC EXERCISES: CPT

## 2024-04-12 NOTE — FLOWSHEET NOTE
EXAMINATION     Test measurements:     *unbolded values are from initial evaluation, bolded = new as of 3/27/24*  Range Tested AROM PROM MMT/Resisted     Left Right Left Right Left Right   Hip Flexion   100     4- 4+ 4- 4+   Hip Abduction         3- 3+ 3- 3+   Hip IR 20 10     3- 3+ 3- 3+   Hip ER 25 20     3- 4- 3- 4-       ASSESSMENT     Today's Assessment:  Patient has made significant improvements in strength, balance, and mobility. He has continued to tolerate all progressions.      Medical Necessity Documentation:  I certify that this patient meets the below criteria necessary for medical necessity for care and/or justification of therapy services:  The patient has functional impairments and/or activity limitations and would benefit from continued outpatient therapy services to address the deficits outlined in the patients goals    Treatment/Activity Tolerance:  [x] Patient tolerated treatment well [] Patient limited by fatique  [] Patient limited by pain  [] Patient limited by other medical complications  [] Other:     Return to Play: NA    Prognosis for POC: [x] Good [] Fair  [] Poor    Patient requires continued skilled intervention: [x] Yes  [] No    GOALS     Patient stated goal: Improve gait pattern, return to sports/hobbies, and reduce stiffness (especially in the morning)  Status: [] Progressing: [x] Met: [] Not Met: [] Adjusted     Therapist goals for Patient:   Short Term Goals: To be achieved in: 2 weeks  Independent in HEP and progression per patient tolerance, in order to progress toward full function and combat daily stiffness/inflammation.              Status: [] Progressing: [x] Met: [] Not Met: [] Adjusted  Patient will demonstrate the proper use of a SPC during gait for at least 100 ft in order to adjust to new usage of an AD throughout ADL's, functional activity, and rehab process.               Status: [] Progressing: [x] Met: [] Not Met: [] Adjusted     Long Term Goals: To be achieved in:  
proper muscle and joint use in functional mobility, and prior level of function.  Status: [] Progressing: [x] Met: [] Not Met: [] Adjusted  Patient will return to walking up to 1 mile with a SPC/hiking pole without increased symptoms or restriction to work towards return to prior level of function and exercise habits.  Status: [] Progressing: [x] Met: [] Not Met: [] Adjusted  Patient will be able to tolerate tandem stance on a foam pad for at least 30 seconds to demonstrate balance improvement for gait, exercise, and hobbies.  Status: [] Progressing: [x] Met: [] Not Met: [] Adjusted    Overall Progression Towards Functional goals/ Treatment Progress Update:  [x] Patient is progressing as expected towards functional goals listed.    [] Progression is slowed due to complexities/Impairments listed.  [] Progression has been slowed due to co-morbidities.  [] Plan just implemented, too soon (<30days) to assess goals progression   [] Goals require adjustment due to lack of progress  [] Patient is not progressing as expected and requires additional follow up with physician  [] Other:     TREATMENT PLAN   Plan: Frequency/Duration: 2x/week for 4-6 weeks for the following treatment interventions:     Interventions:  [x] Therapeutic exercise including: strength training, ROM, including postural re-education.   [x] NMR activation and proprioception, including postural re-education.    [x] Manual therapy as indicated to include: PROM, Gr I-IV mobilizations, and STM  [x] Modalities as needed that may include: NONE  [x] Patient education on joint protection, postural re-education, activity modification, progression of HEP.        [] Aquatic Therapy     Electronically Signed by Lex Woods PT              Date: 04/12/2024

## 2024-04-17 ENCOUNTER — APPOINTMENT (OUTPATIENT)
Dept: PHYSICAL THERAPY | Age: 84
End: 2024-04-17
Payer: MEDICARE

## 2024-04-24 ENCOUNTER — APPOINTMENT (OUTPATIENT)
Dept: PHYSICAL THERAPY | Age: 84
End: 2024-04-24
Payer: MEDICARE

## 2024-05-01 ENCOUNTER — APPOINTMENT (OUTPATIENT)
Dept: PHYSICAL THERAPY | Age: 84
End: 2024-05-01
Payer: MEDICARE

## 2024-05-06 ENCOUNTER — HOSPITAL ENCOUNTER (OUTPATIENT)
Dept: PHYSICAL THERAPY | Age: 84
Setting detail: THERAPIES SERIES
Discharge: HOME | End: 2024-05-06
Payer: MEDICARE

## 2024-05-06 PROCEDURE — 97112 NEUROMUSCULAR REEDUCATION: CPT

## 2024-05-06 PROCEDURE — 97110 THERAPEUTIC EXERCISES: CPT

## 2024-05-06 NOTE — PROGRESS NOTES
[facility]- Outpatient Rehabilitation and Therapy  [address], [place] [address] office: [phone] fax: [phone]      Physical Therapy: TREATMENT/PROGRESS NOTE   Patient: Eder Obrien (83 y.o. 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]  Neck pain [M54.2]   Referring Physician: Dayanna Carl, *  PCP: Malik Zhong MD                             Plan of care signed: NO    Date of Patient follow up with Physician:      Progress Report/POC: EVAL today  POC update due: (10 visits /OR AUTH LIMITS, whichever is less) 10 2024     Precautions/ Contra-indications:                                                                                          Latex allergy:  NO  Pacemaker:    NO  Contraindications for Manipulation: None  Date of Surgery: N/A  Other:      Red Flags:  None    Preferred Language for Healthcare:   [x]English       []other:    SUBJECTIVE EXAMINATION     Patient Report/Comments: Pt reports that he feels significantly better since the beginning of his therapy. Pt notes improved balance and walking endurance.  Pt has started playing softball and able to walk and play 9 holes of golf.         Test used Initial score  2024   Pain Summary VAS 0/10 0/10 0/10   Functional questionnaire LEFS 50% disability 29% disability 25% disability   Other:                  OBJECTIVE EXAMINATION     Test measurements: as of 24    *unbolded values are from initial evaluation,

## 2024-05-06 NOTE — PROGRESS NOTES
Outpatient Physical Therapy  Phone: [phone] Fax: [phone]     To: Dayannajaqui Carl,       From: Lex Woods PT     Patient: Eder Obrien       : 1940  Diagnosis: Gait abnormalities    Date: 2024  Treatment Diagnosis:      Physical Therapy Progress/Discharge Note    Total Visits to date:   12 Cancels/No-shows to date:      Plan of Care/Treatment to date:  [x] Therapeutic Exercise    [] Modalities:  [x] Therapeutic Activity     [] Ultrasound   [] Electrical Stimulation   [x] Gait Training      [] Cervical Traction   [] Lumbar Traction  [x] Neuromuscular Re-education  [] Cold/hotpack  [] Iontophoresis  [x] Instruction in HEP      Other:  [x] Manual Therapy       []                                 [] Aquatic Therapy       []                                       Progress towards goals:  See progress note      Frequency/Duration:  # Days per week: [] 1 day # Weeks: [] 1 week [] 4 weeks      [] 2 days   [] 2 weeks [] 5 weeks     [] 3 days   [] 3 weeks [] 6 weeks     Rehab Potential: [] Excellent [x] Good [] Fair  [] Poor     Goal Status:  [] Achieved [x] Partially Achieved  [] Not Achieved     Patient Status: [] Continue per initial plan of Care     [x] Patient now discharged     [] Additional visits requested, Please re-certify for additional visits:      Requested frequency/duration:  X/week for weeks    Electronically signed by:  Lex Woods PT    If you have any questions or concerns, please don't hesitate to call.  Thank you for your referral.    Physician Signature:________________________________Date:__________________  By signing above, therapist’s plan is approved by physician

## 2024-05-06 NOTE — FLOWSHEET NOTE
bolded = new as of 3/27/24*  Range Tested AROM PROM MMT/Resisted     Left Right Left Right Left Right   Hip Flexion   100     4- 4+ 4- 4+   Hip Abduction         3- 3+ 3- 4   Hip IR 20 10     3- 4 3- 4   Hip ER 25 20     3- 4- 3- 4     Exercises/Interventions:      Therapeutic Ex (38633)  resistance Sets/time Reps Notes/Cues/Progressions   TA set      BKFO      Marching           Bridge           Clamshell           Mini squat           DKTC           Resisted walking all directions 25#   6     Hip stacking           Hip abd  Hip ext  45#  90# 2  2 15  15     Chin tuck           Leg press  100# 3 15     Lat/pec stretching       pec   Rockerboard       Each direction F/B, M/L and rocking   Lateral stepping with green TB        Steamboats            FSU 8\"     15 B   Treadmill 1.5 MPH     Posture and push through focus w/ balance   NuStep L4 6'       TG   6'       Manual Intervention (59130)   TIME       thoracic/lumbar PA's gr3-4; CTJ sitting gr3-4;  STM B cervical paraspinals, B pec stretch and lat stretching.                                                           NMR re-education (34108) resistance Sets/time Reps CUES NEEDED               Rhomberg Balance   30 sec 2 Airex, focus on posture, EC   Tandem balance   30 sec 2 B   Turning      In II bars, on and off airex   Retro walking       In II bars   Cone tapping       SL balance and control   Ball rebound       2# ball, baseball throw               Therapeutic Activity (72353)   Sets/time                                                                      ASSESSMENT     Today's Assessment:  Patient continues to demonstrate significant improvements in strength, balance, and mobility.  Pt has returned to PLOF.   Pt has met 6/7 goals.  Recommend D/C to HEP at this time.        Medical Necessity Documentation:  I certify that this patient meets the below criteria necessary for medical necessity for care and/or justification of therapy services:  The patient has

## 2025-03-20 ENCOUNTER — HOSPITAL ENCOUNTER (OUTPATIENT)
Dept: PHYSICAL THERAPY | Age: 85
Setting detail: THERAPIES SERIES
Discharge: HOME | End: 2025-03-20
Payer: MEDICARE

## 2025-03-20 DIAGNOSIS — R53.1 GENERALIZED WEAKNESS: ICD-10-CM

## 2025-03-20 DIAGNOSIS — R26.89 IMBALANCE: Primary | ICD-10-CM

## 2025-03-20 DIAGNOSIS — R26.9 GAIT ABNORMALITY: ICD-10-CM

## 2025-03-20 PROCEDURE — 97112 NEUROMUSCULAR REEDUCATION: CPT

## 2025-03-20 PROCEDURE — 97162 PT EVAL MOD COMPLEX 30 MIN: CPT

## 2025-03-20 NOTE — PLAN OF CARE
progressing as expected and requires additional follow up with physician  [] Other:     TREATMENT PLAN     Frequency/Duration: 2x/week for 8 weeks for the following treatment interventions:    Interventions:  Therapeutic Exercise (65727) including: strength training, ROM, and functional mobility  Therapeutic Activities (63431) including: functional mobility training and education.  Neuromuscular Re-education (50660) activation and proprioception, including postural re-education.    Gait Training (81702) for normalization of ambulation patterns and AD training.   Patient education on activity modification    Plan: POC initiated as per evaluation    Electronically Signed by Raysa Plunkett PT  Date: 03/20/2025      Note: Portions of this note have been templated and/or copied from initial evaluation, reassessments and prior notes for documentation efficiency.      Note: If patient does not return for scheduled/recommended follow up visits, this note will serve as a discharge from care along with the most recent update on progress.    Neuro Evaluation

## 2025-03-25 ENCOUNTER — HOSPITAL ENCOUNTER (OUTPATIENT)
Dept: PHYSICAL THERAPY | Age: 85
Setting detail: THERAPIES SERIES
Discharge: HOME | End: 2025-03-25
Payer: MEDICARE

## 2025-03-25 PROCEDURE — 97112 NEUROMUSCULAR REEDUCATION: CPT

## 2025-03-25 NOTE — FLOWSHEET NOTE
[facility] - Outpatient Rehabilitation and Therapy: [address] [place] [address] office: [phone] fax: [phone]       Physical Therapy: TREATMENT/PROGRESS NOTE   Patient: Eder Obrien (84 y.o. male)   Examination Date: 2025   :  1940 MRN: [id number]   Visit #: 2   Insurance Allowable Auth Needed   MN []Yes    [x]No    Insurance: Payor: MEDICARE / Plan: MEDICARE PART A AND B / Product Type: *No Product type* /   Insurance ID: [id number] - (Medicare)  Secondary Insurance (if applicable): [employer]   Treatment Diagnosis:     ICD-10-CM    1. Imbalance  R26.89       2. Gait abnormality  R26.9       3. 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]   Referring Physician: Laury Boswell MD  PCP: Malik Zhong MD       Plan of care signed (Y/N):     Date of Patient follow up with Physician:      Plan of Care Report: EVAL today  POC update due: (10 visits /OR AUTH LIMITS, whichever is less)  2025                                             Medical History:  Comorbidities:  Cancer/Tumor  Hypertension  Anxiety  Other Musculoskeletal Conditions: stiff person syndrome  Prior Surgeries: L hip replacement , R hip replacement, R knee replacement  Other: polio as a child, 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    Preferred Language for Healthcare:   [x] English       [] other:    SUBJECTIVE EXAMINATION     Patient stated complaint/comment: Pt notes that he has a few questions about orthotics and

## 2025-04-01 ENCOUNTER — HOSPITAL ENCOUNTER (OUTPATIENT)
Dept: PHYSICAL THERAPY | Age: 85
Setting detail: THERAPIES SERIES
Discharge: HOME | End: 2025-04-01
Payer: MEDICARE

## 2025-04-01 PROCEDURE — 97112 NEUROMUSCULAR REEDUCATION: CPT

## 2025-04-01 NOTE — FLOWSHEET NOTE
[facility] - Outpatient Rehabilitation and Therapy: [address] [place] [address] office: [phone] fax: [phone]       Physical Therapy: TREATMENT/PROGRESS NOTE   Patient: Eder Obrien (84 y.o. male)   Examination Date: 2025   :  1940 MRN: [id number]   Visit #: 3   Insurance Allowable Auth Needed   MN []Yes    [x]No    Insurance: Payor: MEDICARE / Plan: MEDICARE PART A AND B / Product Type: *No Product type* /   Insurance ID: [id number] - (Medicare)  Secondary Insurance (if applicable): [employer]   Treatment Diagnosis:     ICD-10-CM    1. Imbalance  R26.89       2. Gait abnormality  R26.9       3. 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]   Referring Physician: Laury Boswell MD  PCP: Malik Zhong MD       Plan of care signed (Y/N):     Date of Patient follow up with Physician:      Plan of Care Report: EVAL today  POC update due: (10 visits /OR AUTH LIMITS, whichever is less)  2025                                             Medical History:  Comorbidities:  Cancer/Tumor  Hypertension  Anxiety  Other Musculoskeletal Conditions: stiff person syndrome  Prior Surgeries: L hip replacement , R hip replacement, R knee replacement  Other: polio as a child, 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    Preferred Language for Healthcare:   [x] English       [] other:    SUBJECTIVE EXAMINATION     Patient stated complaint/comment: Pt reports that the exercises are going well at home.    From

## 2025-04-03 ENCOUNTER — HOSPITAL ENCOUNTER (OUTPATIENT)
Dept: PHYSICAL THERAPY | Age: 85
Setting detail: THERAPIES SERIES
Discharge: HOME | End: 2025-04-03
Payer: MEDICARE

## 2025-04-03 PROCEDURE — 97112 NEUROMUSCULAR REEDUCATION: CPT

## 2025-04-03 NOTE — FLOWSHEET NOTE
[facility] - Outpatient Rehabilitation and Therapy: [address] [place] [address] office: [phone] fax: [phone]       Physical Therapy: TREATMENT/PROGRESS NOTE   Patient: Eder Obrien (84 y.o. male)   Examination Date: 2025   :  1940 MRN: [id number]   Visit #: 4   Insurance Allowable Auth Needed   MN []Yes    [x]No    Insurance: Payor: MEDICARE / Plan: MEDICARE PART A AND B / Product Type: *No Product type* /   Insurance ID: [id number] - (Medicare)  Secondary Insurance (if applicable): MED[employer]   Treatment Diagnosis:     ICD-10-CM    1. Imbalance  R26.89       2. Gait abnormality  R26.9       3. 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]   Referring Physician: Laury Boswell MD  PCP: Malik Zhong MD       Plan of care signed (Y/N):     Date of Patient follow up with Physician:      Plan of Care Report: NO  POC update due: (10 visits /OR AUTH LIMITS, whichever is less)  2025                                             Medical History:  Comorbidities:  Cancer/Tumor  Hypertension  Anxiety  Other Musculoskeletal Conditions: stiff person syndrome  Prior Surgeries: L hip replacement , R hip replacement, R knee replacement  Other: polio as a child, 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    Preferred Language for Healthcare:   [x] English       [] other:    SUBJECTIVE EXAMINATION     Patient stated complaint/comment: Pt reports that his wife has noticed an improvement in his balance, but

## 2025-04-08 ENCOUNTER — HOSPITAL ENCOUNTER (OUTPATIENT)
Dept: PHYSICAL THERAPY | Age: 85
Setting detail: THERAPIES SERIES
Discharge: HOME | End: 2025-04-08
Payer: MEDICARE

## 2025-04-08 PROCEDURE — 97112 NEUROMUSCULAR REEDUCATION: CPT

## 2025-04-08 NOTE — FLOWSHEET NOTE
[facility] - Outpatient Rehabilitation and Therapy: [address] [place] [address] office: [phone] fax: [phone]       Physical Therapy: TREATMENT/PROGRESS NOTE   Patient: Eder Obrien (84 y.o. male)   Examination Date: 2025   :  1940 MRN: [id number]   Visit #: 5   Insurance Allowable Auth Needed   MN []Yes    [x]No    Insurance: Payor: MEDICARE / Plan: MEDICARE PART A AND B / Product Type: *No Product type* /   Insurance ID: [id number] - (Medicare)  Secondary Insurance (if applicable): MEDICAL M[employer]UAL   Treatment Diagnosis:     ICD-10-CM    1. Imbalance  R26.89       2. Gait abnormality  R26.9       3. 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]   Referring Physician: Laury Boswell MD  PCP: Malik Zhong MD       Plan of care signed (Y/N):     Date of Patient follow up with Physician:      Plan of Care Report: NO  POC update due: (10 visits /OR AUTH LIMITS, whichever is less)  2025                                             Medical History:  Comorbidities:  Cancer/Tumor  Hypertension  Anxiety  Other Musculoskeletal Conditions: stiff person syndrome  Prior Surgeries: L hip replacement , R hip replacement, R knee replacement  Other: polio as a child, 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    Preferred Language for Healthcare:   [x] English       [] other:    SUBJECTIVE EXAMINATION     Patient stated complaint/comment: Pt reports that he is feeling a little better in regards to his balance.

## 2025-04-10 ENCOUNTER — HOSPITAL ENCOUNTER (OUTPATIENT)
Dept: PHYSICAL THERAPY | Age: 85
Setting detail: THERAPIES SERIES
Discharge: HOME | End: 2025-04-10
Payer: MEDICARE

## 2025-04-10 PROCEDURE — 97112 NEUROMUSCULAR REEDUCATION: CPT

## 2025-04-10 NOTE — FLOWSHEET NOTE
or better to demonstrate increased safety at home and in the community and reduce risk for falls.  [] Progressing: [] Met: [] Not Met: [] Adjusted  Patient will improve 6 Min Walk Test to within 20% of LLN with LRAD (least restrictive assistive device) to demonstrate improved endurance for community ambulation.  [] Progressing: [] Met: [] Not Met: [] Adjusted    Overall Progression Towards Functional goals/ Treatment Progress Update:  [] Patient is progressing as expected towards functional goals listed.    [] Progression is slowed due to complexities/Impairments listed.  [] Progression has been slowed due to co-morbidities.  [x] Plan just implemented, too soon (<30days) to assess goals progression   [] Goals require adjustment due to lack of progress  [] Patient is not progressing as expected and requires additional follow up with physician  [] Other:     TREATMENT PLAN     Frequency/Duration: 2x/week for 8 weeks for the following treatment interventions:    Interventions:  Therapeutic Exercise (75706) including: strength training, ROM, and functional mobility  Therapeutic Activities (32561) including: functional mobility training and education.  Neuromuscular Re-education (85444) activation and proprioception, including postural re-education.    Gait Training (19606) for normalization of ambulation patterns and AD training.   Patient education on activity modification    Plan:  Progress balance challenges as pt tolerates.     Electronically Signed by Raysa Plunkett PT  Date: 04/10/2025      Note: Portions of this note have been templated and/or copied from initial evaluation, reassessments and prior notes for documentation efficiency.      Note: If patient does not return for scheduled/recommended follow up visits, this note will serve as a discharge from care along with the most recent update on progress.    Neuro Evaluation

## 2025-04-15 ENCOUNTER — HOSPITAL ENCOUNTER (OUTPATIENT)
Dept: PHYSICAL THERAPY | Age: 85
Setting detail: THERAPIES SERIES
Discharge: HOME | End: 2025-04-15
Payer: MEDICARE

## 2025-04-15 PROCEDURE — 97112 NEUROMUSCULAR REEDUCATION: CPT

## 2025-04-15 NOTE — FLOWSHEET NOTE
[facility] - Outpatient Rehabilitation and Therapy: [address] [place] [address] office: [phone] fax: [phone]       Physical Therapy: TREATMENT/PROGRESS NOTE   Patient: Eder Obrien (84 y.o. male)   Examination Date: 04/15/2025   :  1940 MRN: [id number]   Visit #: 7   Insurance Allowable Auth Needed   MN []Yes    [x]No    Insurance: Payor: MEDICARE / Plan: MEDICARE PART A AND B / Product Type: *No Product type* /   Insurance ID: [id number] - (Medicare)  Secondary Insurance (if applicable): MED[employer] M[employer]   Treatment Diagnosis:     ICD-10-CM    1. Imbalance  R26.89       2. Gait abnormality  R26.9       3. 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]   Referring Physician:  Ellis Boswell MD  PCP: Malik Zhong MD       Plan of care signed (Y/N):     Date of Patient follow up with Physician:      Plan of Care Report: NO  POC update due: (10 visits /OR AUTH LIMITS, whichever is less)  2025                                             Medical History:  Comorbidities:  Cancer/Tumor  Hypertension  Anxiety  Other Musculoskeletal Conditions: stiff person syndrome  Prior Surgeries: L hip replacement , R hip replacement, R knee replacement  Other: polio as a child, 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    Preferred Language for Healthcare:   [x] English       [] other:    SUBJECTIVE EXAMINATION     Patient stated complaint/comment: Pt reports that he was able to do 18 holes of golf yesterday. Had some

## 2025-04-16 ENCOUNTER — TELEPHONE (OUTPATIENT)
Dept: PHYSICAL THERAPY | Age: 85
End: 2025-04-16

## 2025-04-16 NOTE — TELEPHONE ENCOUNTER
Called referring provider's office to request review, sign and return of outpatient rehabilitation plan of care. Spoke with Haylie.   Comments (if applicable):    Patient does not see Dr. Laury Boswell

## 2025-04-17 ENCOUNTER — HOSPITAL ENCOUNTER (OUTPATIENT)
Dept: PHYSICAL THERAPY | Age: 85
Setting detail: THERAPIES SERIES
Discharge: HOME | End: 2025-04-17
Payer: MEDICARE

## 2025-04-17 PROCEDURE — 97112 NEUROMUSCULAR REEDUCATION: CPT

## 2025-04-17 PROCEDURE — 97110 THERAPEUTIC EXERCISES: CPT

## 2025-04-17 NOTE — FLOWSHEET NOTE
[facility] - Outpatient Rehabilitation and Therapy: [address] [place] [address] office: [phone] fax: [phone]       Physical Therapy: TREATMENT/PROGRESS NOTE   Patient: Eder Obrien (84 y.o. male)   Examination Date: 2025   :  1940 MRN: [id number]   Visit #: 8   Insurance Allowable Auth Needed   MN []Yes    [x]No    Insurance: Payor: MEDICARE / Plan: MEDICARE PART A AND B / Product Type: *No Product type* /   Insurance ID: [id number] - (Medicare)  Secondary Insurance (if applicable): [employer]   Treatment Diagnosis:     ICD-10-CM    1. Imbalance  R26.89       2. Gait abnormality  R26.9       3. 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]   Referring Physician:  Ellis Boswell MD  PCP: Malik Zhong MD       Plan of care signed (Y/N):     Date of Patient follow up with Physician:      Plan of Care Report: NO  POC update due: (10 visits /OR AUTH LIMITS, whichever is less)  2025                                             Medical History:  Comorbidities:  Cancer/Tumor  Hypertension  Anxiety  Other Musculoskeletal Conditions: stiff person syndrome  Prior Surgeries: L hip replacement , R hip replacement, R knee replacement  Other: polio as a child, 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    Preferred Language for Healthcare:   [x] English       [] other:    SUBJECTIVE EXAMINATION     Patient stated complaint/comment: Pt reports that his back is stiff again this morning. Played handball,

## 2025-04-22 ENCOUNTER — HOSPITAL ENCOUNTER (OUTPATIENT)
Dept: PHYSICAL THERAPY | Age: 85
Setting detail: THERAPIES SERIES
Discharge: HOME | End: 2025-04-22
Payer: MEDICARE

## 2025-04-22 PROCEDURE — 97110 THERAPEUTIC EXERCISES: CPT

## 2025-04-22 PROCEDURE — 97112 NEUROMUSCULAR REEDUCATION: CPT

## 2025-04-22 NOTE — THERAPY DISCHARGE
[facility] - Outpatient Rehabilitation and Therapy: [address] [place] [address] office: [phone] fax: [phone]      Physical Therapy Discharge Summary    Dear Ellis Boswell MD   ,    We had the pleasure of treating the following patient for physical therapy services at [facility] Outpatient Physical Therapy.  A summary of our findings can be found in the discharge summary below.  If you have any questions or concerns regarding these findings, please do not hesitate to contact me at the office phone number checked above.  Thank you for the referral.         Total Visits: 9     Recommendation:   [] Hold PT, pending MD visit   [x] Discharge to [facility]. Follow up with PT or MD PRN.     Reason for Discharge:  Patient should continue to improve in reasonable time if they continue HEP          Physical Therapy: TREATMENT/PROGRESS NOTE   Patient: Eder Obrien (84 y.o. male)   Examination Date: 2025   :  1940 MRN: [id number]   Visit #: 9   Insurance Allowable Auth Needed   MN []Yes    [x]No    Insurance: Payor: MEDICARE / Plan: MEDICARE PART A AND B / Product Type: *No Product type* /   Insurance ID: [id number] - (Medicare)  Secondary Insurance (if applicable): [employer]   Treatment Diagnosis:     ICD-10-CM    1. Imbalance  R26.89       2. Gait abnormality  R26.9       3. 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

## 2025-04-30 ENCOUNTER — HOSPITAL ENCOUNTER (OUTPATIENT)
Dept: PHYSICAL THERAPY | Age: 85
Setting detail: THERAPIES SERIES
Discharge: HOME | End: 2025-04-30
Payer: MEDICARE

## 2025-04-30 ENCOUNTER — APPOINTMENT (OUTPATIENT)
Dept: PHYSICAL THERAPY | Age: 85
End: 2025-04-30
Payer: MEDICARE

## 2025-04-30 DIAGNOSIS — M25.512 ACUTE PAIN OF BOTH SHOULDERS: Primary | ICD-10-CM

## 2025-04-30 DIAGNOSIS — M25.511 ACUTE PAIN OF BOTH SHOULDERS: Primary | ICD-10-CM

## 2025-04-30 PROCEDURE — 97140 MANUAL THERAPY 1/> REGIONS: CPT

## 2025-04-30 PROCEDURE — 97162 PT EVAL MOD COMPLEX 30 MIN: CPT

## 2025-04-30 NOTE — THERAPY EVALUATION
mobilizations, Soft Tissue Mobilization, and Myofascial Release  Patient education on joint protection, postural re-education, activity modification, and progression of HEP    Plan: POC initiated as per evaluation    Electronically Signed by Lex Woods PT  Date: 04/30/2025     Note: Portions of this note have been templated and/or copied from initial evaluation, reassessments and prior notes for documentation efficiency.    Note: If patient does not return for scheduled/recommended follow up visits, this note will serve as a discharge from care along with the most recent update on progress.    Ortho Evaluation

## 2025-05-07 ENCOUNTER — HOSPITAL ENCOUNTER (OUTPATIENT)
Dept: PHYSICAL THERAPY | Age: 85
Setting detail: THERAPIES SERIES
Discharge: HOME | End: 2025-05-07
Payer: MEDICARE

## 2025-05-07 PROCEDURE — 97110 THERAPEUTIC EXERCISES: CPT

## 2025-05-07 PROCEDURE — 97140 MANUAL THERAPY 1/> REGIONS: CPT

## 2025-05-07 NOTE — FLOWSHEET NOTE
(C7)     Abd. Digiti Minimi (C8, T1)     Hoffmans     Clonus       Scapula Strength     [x] All strength WNL except as marked below   Scapula Left Right Comments   Upper Trapezius      Middle Trapezius 3- 3-    Lower Trapezius 3- 3-    Rhomboid      Serratus Anterior   3- 3-    Latissimus Dorsi        Flexibility   [x] All flexibility WNL except as marked below     Muscle Findings   Pectoralis Minor [] WNL   [x] Tight    Pec Major - Lower [] WNL   [x] Tight   Pec Major - Upper [] WNL   [x] Tight   Latissimus Dorsi  [] WNL   [x] Tight   Levator Scapula [] WNL   [] Tight   Suboccipitals [] WNL   [x] Tight   Upper Trapezius [] WNL   [] Tight   Scalenes [] WNL   [] Tight     Joint Mobility/Accessory Movements (cervical, UE, rib)    Hypomobility cervical spine, CTJ, upper thoracic, scapulo-thoracic joints and B GH joints    Movement Impairments  AGMR  Shoulder     [] R  [] L  [x]  Bilateral  ERS Cerv Spine      [] R  [] L  []  Bilateral        Exercises/Interventions     Therapeutic Ex (09692)  resistance Sets/time Reps Notes/Cues/Progressions          SNF strengthening  3 mins     Scapular squeeze with posture correction  3 mins  HEP    Pec stretching  2x30 secs   HEP    Scapular retraction Maroon TB  5 secs  10    No money Red TB  5 secs  10    UBE  4 mins                          Manual Intervention (61998)  TIME     Gr 3-4 B GH joint mobs posteriorly and inferiorly.  B pec and lat stretching.  Suboccipital release, cervical traction.  Gr 3-4 CTJ and upper thoracic PAs to TPs  21 mins                                 NMR re-education (70764) resistance Sets/time Reps CUES NEEDED                 Posture re-ed  5 mins  HEP                  Therapeutic Activity (47421)  Sets/time                                            Education/Home Exercise Program: HEP discussed and performed, see exercise grid      ASSESSMENT   Assessment:   Eder Obrien is a 84 y.o. male presenting today to Outpatient PT with signs and

## 2025-05-13 ENCOUNTER — HOSPITAL ENCOUNTER (OUTPATIENT)
Dept: PHYSICAL THERAPY | Age: 85
Setting detail: THERAPIES SERIES
Discharge: HOME | End: 2025-05-13
Payer: MEDICARE

## 2025-05-13 PROCEDURE — 97110 THERAPEUTIC EXERCISES: CPT

## 2025-05-13 PROCEDURE — 97140 MANUAL THERAPY 1/> REGIONS: CPT

## 2025-05-13 NOTE — FLOWSHEET NOTE
[facility] - Outpatient Rehabilitation and Therapy: [address] [place] [address] office: [phone] fax: [phone]     Physical Therapy Daily Note      Dear Malik Zhong MD ,    We had the pleasure of evaluating the following patient for physical therapy services at [facility] Outpatient Physical Therapy.  A summary of our findings can be found in the initial assessment below.  This includes our plan of care.  If you have any questions or concerns regarding these findings, please do not hesitate to contact me at the office phone number listed above.  Thank you for the referral.     Physician Signature:_______________________________Date:__________________  By signing above (or electronic signature), therapist’s plan is approved by physician       Physical Therapy: TREATMENT/PROGRESS NOTE   Patient: Eder Obrien (84 y.o. male)   Examination Date: 2025   :  1940 MRN: [id number]   Visit #: 3  Insurance Allowable Auth Needed    [x]Yes    []No    Insurance: Payor: MEDICARE / Plan: MEDICARE PART A AND B / Product Type: *No Product type* /   Insurance ID: [id number] - (Medicare)  Secondary Insurance (if applicable): MED[employer] M[employer]   Treatment Diagnosis:   No diagnosis found.     Medical Diagnosis:  Bilateral shoulder pain [M25.511, M25.512]   Referring Physician: Malik Zhong MD  PCP: Malik Zhong MD     Plan of care signed (Y/N):     Date of Patient follow up with Physician:      Plan of Care Report: EVAL today  POC update due: (10 visits /OR AUTH LIMITS, whichever is less)  2025                                             Medical History:  Comorbidities:  Cancer/Tumor  Hypertension  COVID-19  Relevant Medical History: See medical chart                                         Precautions/ Contra-indications:           Latex allergy:  YES  Pacemaker:    NO  Contraindications for Manipulation: None  Date of Surgery: None  Other:    Red

## 2025-05-20 ENCOUNTER — HOSPITAL ENCOUNTER (OUTPATIENT)
Dept: PHYSICAL THERAPY | Age: 85
Setting detail: THERAPIES SERIES
Discharge: HOME | End: 2025-05-20
Payer: MEDICARE

## 2025-05-20 PROCEDURE — 97110 THERAPEUTIC EXERCISES: CPT

## 2025-05-20 PROCEDURE — 97140 MANUAL THERAPY 1/> REGIONS: CPT

## 2025-05-20 NOTE — FLOWSHEET NOTE
[facility] - Outpatient Rehabilitation and Therapy: [address] [place] [address] office: [phone] fax: [phone]       Physical Therapy: TREATMENT/PROGRESS NOTE   Patient: Eder Obrien (84 y.o. male)   Examination Date: 2025   :  1940 MRN: [id number]   Visit #: 3  Insurance Allowable Auth Needed    [x]Yes    []No    Insurance: Payor: MEDICARE / Plan: MEDICARE PART A AND B / Product Type: *No Product type* /   Insurance ID: [id number] - (Medicare)  Secondary Insurance (if applicable): MED[employer] M[employer]   Treatment Diagnosis:   No diagnosis found.     Medical Diagnosis:  Bilateral shoulder pain [M25.511, M25.512]   Referring Physician: Malik Zhong MD  PCP: Malik Zhong MD     Plan of care signed (Y/N): Y    Date of Patient follow up with Physician:      Plan of Care Report: NO  POC update due: (10 visits /OR AUTH LIMITS, whichever is less)  25                                             Medical History:  Comorbidities:  Cancer/Tumor  Hypertension  COVID-19  Relevant Medical History: See medical chart                                         Precautions/ Contra-indications:           Latex allergy:  YES  Pacemaker:    NO  Contraindications for Manipulation: None  Date of Surgery: None  Other:    Red Flags:  None    Suicide Screening:   The patient did not verbalize a primary behavioral concern, suicidal ideation, suicidal intent, or demonstrate suicidal behaviors.    Preferred Language for Healthcare:   [x] English       [] other:    SUBJECTIVE EXAMINATION     Patient stated complaint:  Pt reports feeling great.  No restrictions but pain at times.  Pain mainly at night and wakes him.  During the day pt notes feeling really good.       At initial evaluation:  Pt reports weakness in B shoulders.  Pain at night with sleeping.  Pt can sleep on each shoulder but it can take time to 'get the position right'.  Pain will improve once he is awake and moving

## 2025-05-21 ENCOUNTER — TELEPHONE (OUTPATIENT)
Dept: PHYSICAL THERAPY | Age: 85
End: 2025-05-21

## 2025-05-21 NOTE — TELEPHONE ENCOUNTER
Called referring provider's office to request review, sign and return of outpatient rehabilitation plan of care. Spoke with Umu.   Comments (if applicable):

## 2025-05-22 ENCOUNTER — HOSPITAL ENCOUNTER (OUTPATIENT)
Dept: PHYSICAL THERAPY | Age: 85
Setting detail: THERAPIES SERIES
Discharge: HOME | End: 2025-05-22
Payer: MEDICARE

## 2025-05-22 PROCEDURE — 97140 MANUAL THERAPY 1/> REGIONS: CPT

## 2025-05-22 PROCEDURE — 97110 THERAPEUTIC EXERCISES: CPT

## 2025-05-22 NOTE — FLOWSHEET NOTE
weeks  1Independent in HEP and progression per patient tolerance, in order to prevent re-injury.   [x] Progressing: [] Met: [] Not Met: [] Adjusted  Patient will have a decrease in pain to 0/10 to facilitate improvement in movement, function, and ADLs as indicated by Functional Deficits.  [x] Progressing: [] Met: [] Not Met: [] Adjusted    Long Term Goals: To be achieved in: 4 weeks  Disability index score of 15% or less for the Quick DASH to assist with reaching prior level of function with activities such as sleeping.  [] Progressing: [] Met: [] Not Met: [] Adjusted  Patient will demonstrate increased AROM of S' to WNL without pain to allow for proper joint functioning to enable patient to return to full activity.   [x] Progressing: [] Met: [] Not Met: [] Adjusted  Patient will demonstrate increased Strength of S' abd to demonstrate improved muscle activation/motor control to allow for proper functional mobility to enable patient to return to hand ball.   [x] Progressing: [] Met: [] Not Met: [] Adjusted  Patient will return to sleeping without increased symptoms or restriction.   [] Progressing: [] Met: [] Not Met: [] Adjusted  Patient return to full strength R UE with hand ball.  [] Progressing: [] Met: [] Not Met: [] Adjusted     Overall Progression Towards Functional goals/ Treatment Progress Update:  [] Patient is progressing as expected towards functional goals listed.    [] Progression is slowed due to complexities/Impairments listed.  [] Progression has been slowed due to co-morbidities.  [x] Plan just implemented, too soon (<30days) to assess goals progression   [] Goals require adjustment due to lack of progress  [] Patient is not progressing as expected and requires additional follow up with physician  [] Other:     TREATMENT PLAN     Frequency/Duration: 2x/week for 4-6 weeks for the following treatment interventions:    Interventions:  Therapeutic Exercise (60004) including: strength training, ROM, and

## 2025-06-11 ENCOUNTER — HOSPITAL ENCOUNTER (OUTPATIENT)
Dept: PHYSICAL THERAPY | Age: 85
Setting detail: THERAPIES SERIES
Discharge: HOME | End: 2025-06-11
Payer: MEDICARE

## 2025-06-11 PROCEDURE — 97110 THERAPEUTIC EXERCISES: CPT

## 2025-06-11 PROCEDURE — 97140 MANUAL THERAPY 1/> REGIONS: CPT

## 2025-06-11 NOTE — FLOWSHEET NOTE
functional mobility training and education.  Neuromuscular Re-education (35237) activation and proprioception, including postural re-education.    Manual Therapy (03709) as indicated to include: Passive Range of Motion, Gr I-IV mobilizations, Soft Tissue Mobilization, and Myofascial Release  Patient education on joint protection, postural re-education, activity modification, and progression of HEP    Plan: POC initiated as per evaluation    Electronically Signed by Lex Woods, PT  Date: 06/11/2025     Note: Portions of this note have been templated and/or copied from initial evaluation, reassessments and prior notes for documentation efficiency.    Note: If patient does not return for scheduled/recommended follow up visits, this note will serve as a discharge from care along with the most recent update on progress.    Ortho Evaluation

## 2025-06-16 ENCOUNTER — HOSPITAL ENCOUNTER (OUTPATIENT)
Dept: PHYSICAL THERAPY | Age: 85
Setting detail: THERAPIES SERIES
Discharge: HOME | End: 2025-06-16
Payer: MEDICARE

## 2025-06-16 DIAGNOSIS — M25.511 CHRONIC PAIN OF BOTH SHOULDERS: Primary | ICD-10-CM

## 2025-06-16 DIAGNOSIS — G89.29 CHRONIC PAIN OF BOTH SHOULDERS: Primary | ICD-10-CM

## 2025-06-16 DIAGNOSIS — M25.512 CHRONIC PAIN OF BOTH SHOULDERS: Primary | ICD-10-CM

## 2025-06-16 PROCEDURE — 97140 MANUAL THERAPY 1/> REGIONS: CPT

## 2025-06-16 PROCEDURE — 97110 THERAPEUTIC EXERCISES: CPT

## 2025-06-16 NOTE — FLOWSHEET NOTE
[facility] - Outpatient Rehabilitation and Therapy: [address] [place] [address] office: [phone] fax: [phone]       Physical Therapy: TREATMENT/PROGRESS NOTE   Patient: Eder Obrien (84 y.o. male)   Examination Date: 2025   :  1940 MRN: [id number]   Visit #: 7  Insurance Allowable Auth Needed    [x]Yes    []No    Insurance: Payor: MEDICARE / Plan: MEDICARE PART A AND B / Product Type: *No Product type* /   Insurance ID: [id number] - (Medicare)  Secondary Insurance (if applicable): MED[employer] M[employer]   Treatment Diagnosis:     ICD-10-CM    1. Chronic pain of both shoulders  M25.511     G89.29     M25.512            Medical Diagnosis:  Bilateral shoulder pain [M25.511, M25.512]   Referring Physician: Malik Zhong MD  PCP: Malik Zhong MD     Plan of care signed (Y/N): Y    Date of Patient follow up with Physician:      Plan of Care Report: NO  POC update due: (10 visits /OR AUTH LIMITS, whichever is less)  NV                                            Medical History:  Comorbidities:  Cancer/Tumor  Hypertension  COVID-19  Relevant Medical History: See medical chart                                         Precautions/ Contra-indications:           Latex allergy:  YES  Pacemaker:    NO  Contraindications for Manipulation: None  Date of Surgery: None  Other:    Red Flags:  None    Suicide Screening:   The patient did not verbalize a primary behavioral concern, suicidal ideation, suicidal intent, or demonstrate suicidal behaviors.    Preferred Language for Healthcare:   [x] English       [] other:    SUBJECTIVE EXAMINATION     Patient stated complaint:   Pt reports the shoulders are feeling much better but notes R elbow pain has started.  Pt notes pain is not keeping him up.  Pt notes he 'punched a lot of tickets' this weekend at the [employer].      At initial evaluation:  Pt reports weakness in B shoulders.  Pain at night with sleeping.  Pt can sleep on

## 2025-06-18 ENCOUNTER — HOSPITAL ENCOUNTER (OUTPATIENT)
Dept: PHYSICAL THERAPY | Age: 85
Setting detail: THERAPIES SERIES
Discharge: HOME | End: 2025-06-18
Payer: MEDICARE

## 2025-06-18 PROCEDURE — 97140 MANUAL THERAPY 1/> REGIONS: CPT

## 2025-06-18 PROCEDURE — 97110 THERAPEUTIC EXERCISES: CPT

## 2025-06-18 NOTE — FLOWSHEET NOTE
[facility] - Outpatient Rehabilitation and Therapy: [address] [place] [address] office: [phone] fax: [phone]       Physical Therapy: TREATMENT/PROGRESS NOTE   Patient: Eder Obrien (84 y.o. male)   Examination Date: 2025   :  1940 MRN: [id number]   Visit #: 7  Insurance Allowable Auth Needed    [x]Yes    []No    Insurance: Payor: MEDICARE / Plan: MEDICARE PART A AND B / Product Type: *No Product type* /   Insurance ID: [id number] - (Medicare)  Secondary Insurance (if applicable): MEDICAL M[employer]UAL   Treatment Diagnosis:   No diagnosis found.       Medical Diagnosis:  Bilateral shoulder pain [M25.511, M25.512]   Referring Physician: Malik Zhong MD  PCP: Malik Zhong MD     Plan of care signed (Y/N): Y    Date of Patient follow up with Physician:      Plan of Care Report: NO  POC update due: (10 visits /OR AUTH LIMITS, whichever is less)                                              Medical History:  Comorbidities:  Cancer/Tumor  Hypertension  COVID-19  Relevant Medical History: See medical chart                                         Precautions/ Contra-indications:           Latex allergy:  YES  Pacemaker:    NO  Contraindications for Manipulation: None  Date of Surgery: None  Other:    Red Flags:  None    Suicide Screening:   The patient did not verbalize a primary behavioral concern, suicidal ideation, suicidal intent, or demonstrate suicidal behaviors.    Preferred Language for Healthcare:   [x] English       [] other:    SUBJECTIVE EXAMINATION     Patient stated complaint:   Pt reports that there is no pain in his shoulder even after golfing 18 holes yesterday. He reports having no elbow pain since last treatment session. Pt feels that his shoulder movement feels much more \"natural\".     At initial evaluation:  Pt reports weakness in B shoulders.  Pain at night with sleeping.  Pt can sleep on each shoulder but it can take time to 'get the

## (undated) DEVICE — CATHETER IV 20GA L1.25IN PNK FEP SFTY STR HUB RADPQ DISP

## (undated) DEVICE — NEEDLE FLTR 19GA L1.5IN WALL THK5UM BRN POLYPR HUB S STL

## (undated) DEVICE — NEEDLE HYPO 25GA L1.5IN BLU POLYPR HUB S STL REG BVL STR

## (undated) DEVICE — 3M(TM) TRANSPORE SURGICAL TAPE 1527-1: Brand: 3M™ TRANSPORE™

## (undated) DEVICE — SOLUTION IRRIG 250ML STRL H2O PLAS POUR BTL USP

## (undated) DEVICE — TOWEL,OR,DSP,ST,BLUE,STD,4/PK,20PK/CS: Brand: MEDLINE

## (undated) DEVICE — SET GRAV VENT NVENT CK VLV 3 NDL FREE PRT 10 GTT

## (undated) DEVICE — SURGICAL PROCEDURE PACK EYE ANDRSN

## (undated) DEVICE — SOLUTION IV 1000ML LAC RINGERS PH 6.5 INJ USP VIAFLX PLAS

## (undated) DEVICE — SILICONE I/A TIP STRAIGHT: Brand: ALCON

## (undated) DEVICE — ELECTRODE ECG MONITR FOAM TEAR DROP ADLT RED

## (undated) DEVICE — GLOVE ORANGE PI 8   MSG9080

## (undated) DEVICE — SET ADMIN PRIMING 7ML L30IN 7.35LB 20 GTT 2ND RLER CLMP

## (undated) DEVICE — AIRLIFE™ NASAL OXYGEN CANNULA CURVED, FLARED TIP, WITH 7 FEET (2.1 M) CRUSH RESISTANT TUBING, OVER-THE-EAR STYLE: Brand: AIRLIFE™

## (undated) DEVICE — GOWN,SIRUS,NON REINFRCD,LARGE,SET IN SL: Brand: MEDLINE

## (undated) DEVICE — Device

## (undated) DEVICE — 3M™ TEGADERM™ TRANSPARENT FILM DRESSING FRAME STYLE, 1624W, 2-3/8 IN X 2-3/4 IN (6 CM X 7 CM), 100/CT 4CT/CASE: Brand: 3M™ TEGADERM™

## (undated) DEVICE — GLOVE SURG SZ 65 L12IN FNGR THK94MIL STD WHT LTX FREE